# Patient Record
Sex: MALE | Race: WHITE | NOT HISPANIC OR LATINO | Employment: UNEMPLOYED | URBAN - METROPOLITAN AREA
[De-identification: names, ages, dates, MRNs, and addresses within clinical notes are randomized per-mention and may not be internally consistent; named-entity substitution may affect disease eponyms.]

---

## 2020-10-01 ENCOUNTER — TRANSFERRED RECORDS (OUTPATIENT)
Dept: HEALTH INFORMATION MANAGEMENT | Facility: CLINIC | Age: 55
End: 2020-10-01

## 2020-10-04 ENCOUNTER — TRANSFERRED RECORDS (OUTPATIENT)
Dept: HEALTH INFORMATION MANAGEMENT | Facility: CLINIC | Age: 55
End: 2020-10-04

## 2020-10-05 ENCOUNTER — TRANSFERRED RECORDS (OUTPATIENT)
Dept: HEALTH INFORMATION MANAGEMENT | Facility: CLINIC | Age: 55
End: 2020-10-05

## 2020-10-05 LAB — EJECTION FRACTION: 56 %

## 2020-10-19 ENCOUNTER — TRANSFERRED RECORDS (OUTPATIENT)
Dept: HEALTH INFORMATION MANAGEMENT | Facility: CLINIC | Age: 55
End: 2020-10-19

## 2020-11-03 ENCOUNTER — DOCUMENTATION ONLY (OUTPATIENT)
Dept: CARE COORDINATION | Facility: CLINIC | Age: 55
End: 2020-11-03

## 2020-11-10 ENCOUNTER — TRANSFERRED RECORDS (OUTPATIENT)
Dept: HEALTH INFORMATION MANAGEMENT | Facility: CLINIC | Age: 55
End: 2020-11-10

## 2020-11-24 ENCOUNTER — TRANSFERRED RECORDS (OUTPATIENT)
Dept: HEALTH INFORMATION MANAGEMENT | Facility: CLINIC | Age: 55
End: 2020-11-24

## 2020-12-14 ENCOUNTER — OFFICE VISIT (OUTPATIENT)
Dept: CARDIOLOGY | Facility: CLINIC | Age: 55
End: 2020-12-14
Attending: INTERNAL MEDICINE
Payer: COMMERCIAL

## 2020-12-14 VITALS
HEART RATE: 109 BPM | SYSTOLIC BLOOD PRESSURE: 149 MMHG | BODY MASS INDEX: 39.96 KG/M2 | OXYGEN SATURATION: 95 % | DIASTOLIC BLOOD PRESSURE: 106 MMHG | HEIGHT: 72 IN | WEIGHT: 295 LBS

## 2020-12-14 DIAGNOSIS — E78.5 HYPERLIPIDEMIA LDL GOAL <100: ICD-10-CM

## 2020-12-14 DIAGNOSIS — R06.02 SOB (SHORTNESS OF BREATH): ICD-10-CM

## 2020-12-14 DIAGNOSIS — I27.20 PULMONARY HYPERTENSION (H): Primary | ICD-10-CM

## 2020-12-14 PROCEDURE — G0463 HOSPITAL OUTPT CLINIC VISIT: HCPCS

## 2020-12-14 PROCEDURE — 99205 OFFICE O/P NEW HI 60 MIN: CPT | Performed by: INTERNAL MEDICINE

## 2020-12-14 RX ORDER — FUROSEMIDE 40 MG
40 TABLET ORAL DAILY
COMMUNITY
Start: 2020-12-08 | End: 2021-09-10

## 2020-12-14 RX ORDER — TRAZODONE HYDROCHLORIDE 50 MG/1
TABLET, FILM COATED ORAL DAILY
COMMUNITY
Start: 2020-12-09

## 2020-12-14 RX ORDER — BUDESONIDE AND FORMOTEROL FUMARATE DIHYDRATE 160; 4.5 UG/1; UG/1
AEROSOL RESPIRATORY (INHALATION) DAILY
COMMUNITY
Start: 2020-12-08 | End: 2022-08-29

## 2020-12-14 RX ORDER — TIOTROPIUM BROMIDE INHALATION SPRAY 3.12 UG/1
SPRAY, METERED RESPIRATORY (INHALATION) DAILY
COMMUNITY
Start: 2020-12-10 | End: 2022-08-29

## 2020-12-14 RX ORDER — ALBUTEROL SULFATE 90 UG/1
AEROSOL, METERED RESPIRATORY (INHALATION) PRN
COMMUNITY
Start: 2020-11-30

## 2020-12-14 RX ORDER — NICOTINE POLACRILEX 4 MG/1
LOZENGE ORAL
COMMUNITY
Start: 2020-11-18 | End: 2021-03-15

## 2020-12-14 RX ORDER — LIDOCAINE 40 MG/G
CREAM TOPICAL
Status: CANCELLED | OUTPATIENT
Start: 2020-12-14

## 2020-12-14 RX ORDER — BUPROPION HYDROCHLORIDE 150 MG/1
150 TABLET, EXTENDED RELEASE ORAL DAILY
COMMUNITY
Start: 2020-12-08 | End: 2022-08-29

## 2020-12-14 ASSESSMENT — MIFFLIN-ST. JEOR: SCORE: 2206.11

## 2020-12-14 ASSESSMENT — PAIN SCALES - GENERAL: PAINLEVEL: NO PAIN (0)

## 2020-12-14 NOTE — NURSING NOTE
Procedures and/or Testing: Patient given instructions regarding  6 minute walk test,  Pulmonary Function Test, V/Q Scan,. Discussed purpose, preparation, procedure and when to expect results reported back to the patient. Patient demonstrated understanding of this information and agreed to call with further questions or concerns.  Right Heart Catheterization: Patient was instructed regarding right heart catheterization, including discussion of the procedure, preparation, intra-procedural steps, and recovery at home. Patient demonstrated understanding of this information and agreed to call with further questions or concerns.  Labs: Patient was given results of the laboratory testing obtained today. Patient demonstrated understanding of this information and agreed to call with further questions or concerns.   Diet: Patient instructed regarding a heart healthy diet, including discussion of reduced fat and sodium intake. Patient demonstrated understanding of this information and agreed to call with further questions or concerns.  Return Appointment: Patient given instructions regarding scheduling next clinic visit. Patient demonstrated understanding of this information and agreed to call with further questions or concerns.  Patient stated he understood all health information given and agreed to call with further questions or concerns.    Staff message sent to [facility] to schedule F/U testing. Queenie Harley RN on 12/14/2020 at 3:42 PM

## 2020-12-14 NOTE — LETTER
2020      RE: Giuseppe Linton  [address] [facility] [address]       Dear Colleague,    Thank you for the opportunity to participate in the care of your patient, Giuseppe Linton, at the [facility]. Please see a copy of my visit note below.    Chief Complaint   Patient presents with     New Patient     New PH referral from  Ghada Pop @ [facility]      Vitals were taken and medications reconciled.     Gorge Epperson CMA  1:46 PM      Service Date: 2020      Ghada Ppo MD   [facility]   [address], [facility] [address]   [place] [address]      RE: Giuseppe Linton   MRN: [id number]   :  1965      Dear Dr. Pop:      We had the pleasure of seeing Mr. Giuseppe Linton in our Pulmonary Hypertension Clinic at the [facility].  Although you are familiar with his history, please allow me to summarize it for the purpose of records.      Mr. Linton is a 55-year-old gentleman with no significant past medical problems up until October of this year, when he presented to [facility] with worsening exertional shortness of breath and lower extremity swelling.  His echocardiogram showed severely dilated right ventricle with severely reduced right ventricular function.  His estimated right ventricular systolic pressure was elevated.  His CT scan of the chest showed bilateral emphysema.  He also has chronic methamphetamine use for the last 25 years.  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  He was aggressively diuresed.  He was subsequently started on supplemental oxygen 3 liters at rest and 6 liters with exertion.  He had a sleep study that showed evidence of significant obstructive sleep apnea.  He was recommended to follow up with us for further evaluation and management of his pulmonary hypertension and right  heart failure.      Mr. Linton states that he has been having exertional shortness of breath for the last one year, which has been progressively getting worse.  His shortness of breath worsened in the last 6 months preceding his hospitalization at [facility] in October.  He also started noticing lower extremity swelling for the last 6 months.  Furthermore, he started having syncopal episodes, especially after coughing every time in October that really made him seek medical attention.  He has had 6-8 episodes of cough-associated syncope.  He denies having any paroxysmal nocturnal dyspnea or orthopnea.  He does complain of exertional chest pain and chest tightness.  He denies having exertional presyncope or syncope.  His syncope is mainly after coughing.  He has palpitations.      He states that his symptoms are slightly better since most recent hospitalization, being on oxygen and on Lasix; however, he continues to be still significantly limited.  He cannot walk more than a block without stopping. I would currently characterize him as NYHA functional class IIIB.  He has not smoked methamphetamine for the last 85 days.  He has cut down his cigarette smoking significantly.  Of note, he had burned himself in his nose while trying to smoke with oxygen on.      He is accompanied by his sister, who is helping him with his medical care.      PAST MEDICAL HISTORY:   1.  Obesity.   2.  Recent diagnosis of obstructive sleep apnea.   3.  Methamphetamine use.      PAST SURGICAL HISTORY:  Not significant.      MEDICATIONS:  Current Outpatient Medications   Medication Sig     albuterol (PROAIR HFA/PROVENTIL HFA/VENTOLIN HFA) 108 (90 Base) MCG/ACT inhaler as needed     buPROPion (WELLBUTRIN SR) 150 MG 12 hr tablet 150 mg daily     furosemide (LASIX) 40 MG tablet 40 mg daily     SM NICOTINE POLACRILEX 4 MG lozenge      SPIRIVA RESPIMAT 2.5 MCG/ACT inhaler daily     SYMBICORT 160-4.5 MCG/ACT Inhaler daily     traZODone (DESYREL) 50  MG tablet daily     No current facility-administered medications for this visit.      REVIEW OF SYSTEMS:  A detailed 10-point review of systems was obtained as described in the History of Present Illness.  All other systems reviewed and are negative.      PERSONAL AND SOCIAL HISTORY:  He is not .  He has 3 grown kids who are healthy.  He lives alone.  He was a .  He has not been working now with the COVID and health conditions.  He has been smoking 1 pack a day since he was 14 years old.  He has been using meth for the last 20 years.  He does use marijuana on and off.  He denies using cocaine recently, but has used in the remote past.  He does not use any other recreational drugs.  He has not used any diet pills to lose weight.      PHYSICAL EXAMINATION:  He was comfortable.  He was in no apparent distress.  He was awake, alert, oriented x3.  His blood pressure was 149/106, pulse rate was 109, respiratory rate was 16, and he was saturating 95% on 3 liters of oxygen.  He was 6 feet tall.  His BMI was 40.0.  He had no pallor, cyanosis or jaundice.  His neck exam did not reveal any jugular venous distention.  His carotids were 1+ bilaterally.  His pulse was regular in rhythm.  Cardiac auscultation revealed normal S1, loud P2, sinus tachycardia, no murmur, rub or gallop.  Auscultation of his lungs revealed bilateral coarse breath sounds.  He had audible wheezing.  He had no crepitation.  His abdomen was soft with normal bowel sounds, no tenderness, no rigidity, no guarding.  He had no focal neurological deficit.  His extremities showed 1+ edema bilaterally.      I personally reviewed all his testing so far from [facility].  His electrocardiogram showed sinus tachycardia, right ventricular hypertrophy with strain pattern, right axis deviation, all concerning for severe pulmonary hypertension.      I personally reviewed his echocardiogram.  His right ventricle is severely dilated with  severely reduced function.  His intraventricular septum was flattened, consistent with right ventricular pressure and volume overload.  He had moderate tricuspid regurgitation.  His TAPSE was 30 mmHg.  His S prime was 8 cm per second.  His estimated right ventricular systolic pressure was 60 mmHg.  His right atrium was normal in size.  His IVC was normal in size and collapsing more than 50%, consistent with normal right atrial pressure.  His left ventricle was underfilled and small in size.  His left ventricular systolic function was normal.  His left atrium was normal.  He had no evidence of diastolic abnormality.  He had no evidence of any other significant valvular abnormalities.  He had no pericardial effusion.      He has had a CT pulmonary angiogram showed no evidence of proximal chronic thromboembolic disease.  He had evidence of bilateral apical emphysema.  He also had a bilateral pulmonary nodule measuring 1.4 cm in size.  He had pleural calcifications.  He had no evidence of interstitial lung disease.      He had a sleep study locally that showed evidence of moderate to severe obstructive sleep apnea.      ASSESSMENT AND PLAN:     In summary, Mr. Giuseppe Linton is a 55-year-old gentleman with past medical history of chronic methamphetamine use, obesity, obstructive sleep apnea and bilateral apical emphysema, who was referred to us for further management of pulmonary hypertension and right ventricular function.      Mr. 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  He does have bilateral apical emphysema and has a history of tobacco use.  It is possible that his emphysema is also contributing to his pulmonary arterial hypertension.  Furthermore, he also has obstructive sleep apnea which is  probably making things worse.  However, I think the predominant  for his pulmonary vascular disease is his methamphetamine use.  The magnitude of his emphysema  does not correlate with the severity of his pulmonary hypertension.  Likewise, we do not usually see severe pulmonary hypertension and RV dysfunction with obstructive sleep apnea alone.      To this effect, to complete the work-up for his pulmonary hypertension, I have recommended him to have a right heart catheterization.  This will help us confirm the diagnosis, assess severity, assess vasodilator response, as well as assess left-sided filling pressure.  We will also do a V/Q scan to rule out distal chronic thromboembolic disease.  We will do a pulmonary function test to have an objective assessment of the severity of his COPD.  We will also repeat a 6-minute walk test here locally or obtain his last 6-minute walk test from [facility].  Finally, we will complete the pulmonary hypertension serological work-up.      We will decide on therapy based on his workup.  Right now, he appears to be only mildly volume overloaded.  I have recommended him to continue the Lasix 40 mg daily for now.  He is on oxygen 3 liters at rest and 6 liters with exertion which I recommended him to continue.  We reinforced the importance of abstinence from methamphetamine use and cigarette smoking.  We also discussed the importance of a low-salt diet and fluid restriction.  He knows that he cannot smoke with oxygen.      I briefly discussed the various options of pulmonary arterial hypertension associated with methamphetamine use.  This includes oral therapy, inhaled treprostinil or parenteral prostacyclin therapy.  He will complete these testings in the next 1-2 weeks.  He will return to see us for follow-up after completing the testing.  He will call us in the interim if he has any further worsening symptoms.      We will defer the treatment of his emphysema to Dr. Ghada Pop.  He is already on a long-acting bronchodilator, as well as an anticholinergic agent.  He is on supplemental oxygen therapy.      It was a pleasure  meeting Mr. Giuseppe Serra in our Pulmonary Hypertension Clinic at the [facility].  We thank you for involving us in his care.  Please do not hesitate to call us in the interim if you have any further questions.      Sincerely,   Thiago Das MD   [facility]  Heart Failure, Transplant, and Mechanical Circulatory Support Cardiology   Cardiovascular Division  [facility]   [phone]               D: 2020   T: 2020   MT: al      Name:     GIUSEPPE SERRA   MRN:      [id number]        Account:      [id number]   :      1965           Service Date: 2020      Document: [id number]          Please do not hesitate to contact me if you have any questions/concerns.     Sincerely,     Thiago Das MD

## 2020-12-14 NOTE — PATIENT INSTRUCTIONS
Medication Changes:  - No medication changes at this time. Continue current regiment.     Patient Instructions:  1. Continue staying active and eat a heart healthy diet.    2. Please keep current list of medications with you at all times.    3. Remember to weigh yourself daily after voiding and before you consume any food or beverages and log the numbers.  If you have gained 2 pounds overnight or 5 pounds in a week contact us immediately for medication adjustments or further instructions.    4. **Please call us immediately if you have any syncope (fainting or passing out), chest pain, edema (swelling or weight gain), or decline in your functional status (general decline in how you are feeling).    Follow up Appointment Information:  - Right heart catheterization, V/Q scan, 6 minute walk test, and PFTs in the next few weeks to assess your pulmonary hypertension. [facility] will call you to schedule in the next few days  - Follow up with Dr. Das after testing complete to develop a plan moving forward    Check-In  Time Check-In Location Estimated Length Procedure   Name        [facility] room 60-90 minutes Right Heart Catheterization**     Procedure Preparations & Instructions     This is an invasive procedure that DOES require preparation:    - Nothing to eat for 6 hours   - You may have clear liquids up until the time of your procedure  - A ride should be arranged for you in the instance you are unable to drive home, however you should be able to function as you normally would after the procedure     *Mandatory COVID Testing:   Pt will need to complete a COVID test no sooner than 4 days prior to their procedure.      To schedule COVID testing Please call [phone]    If you are running into and issues please call us.     Check-In  Time Check-In Location Estimated Length Procedure   Name        [facility]   waiting room 60 minutes VQ/Lung Perfusion Scan**   Procedure Preparations & Instructions     This is a  non-invasive procedure and does NOT require any preparation         Check-In  Time Check-In Location Estimated Length Procedure   Name        [facility]   3rd Floor 30 minutes Six Minute Walk Test**   Procedure Preparations & Instructions     This is a non-invasive procedure and does NOT require any preparation         Check-In  Time Check-In Location Estimated Length Procedure   Name        [facility]   3rd Floor 60 minutes Pulmonary Function Test**   Procedure Preparations & Instructions     This is a non-invasive procedure and does NOT require any preparation         We are located on the third floor of the Clinic [facility] Center ([facility]) on the [facility].  Our address is     [address] [place] [address] on [address] [place] [address]    Thank you for allowing us to be a part of your care here at the [facility]    If you have questions or concerns please contact us at:    Hannah Thompson, RN, BSN, PHN [facility] (Scheduling,Prior Auth)  Nurse Coordinator     Clinic   Pulmonary Hypertension   Pulmonary Hypertension  [facility]  (Phone)[phone]   (Phone) [phone]        (Fax) [phone]    ** Please note that you will NOT receive a reminder call regarding your scheduled testing, reminder calls are for provider appointments only.  If you are scheduled for testing within the [employer] system you may receive a call regarding pre-registration for billing purposes only.**     Support Group:  Pulmonary Hypertension Association  Https://www.phassociation.org/  **Look at the Events Tab** They even have Support Groups that you can call into    [facility] Support Group  Second Saturday of the Month from 1-3 PM   Location: [address] [place], [place] [id number]  Leader: Adenike Pastrana and Yana Fu  Phone: [phone] or [phone]  Email: [email or web address]

## 2020-12-14 NOTE — PROGRESS NOTES
Service Date: 2020      Ghada Pop MD   [facility]   [address], [place] [address]      RE: Giuseppe Linton   MRN: [id number]   :  1965      Dear Dr. Pop:      We had the pleasure of seeing Mr. Giuseppe Linton in our Pulmonary Hypertension Clinic at the [facility].  Although you are familiar with his history, please allow me to summarize it for the purpose of records.      Mr. Linton is a 55-year-old gentleman with no significant past medical problems up until October of this year, when he presented to [facility] with worsening exertional shortness of breath and lower extremity swelling.  His echocardiogram showed severely dilated right ventricle with severely reduced right ventricular function.  His estimated right ventricular systolic pressure was elevated.  His CT scan of the chest showed bilateral emphysema.  He also has chronic methamphetamine use for the last 25 years.  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  He was aggressively diuresed.  He was subsequently started on supplemental oxygen 3 liters at rest and 6 liters with exertion.  He had a sleep study that showed evidence of significant obstructive sleep apnea.  He was recommended to follow up with us for further evaluation and management of his pulmonary hypertension and right heart failure.      Mr. Linton states that he has been having exertional shortness of breath for the last one year, which has been progressively getting worse.  His shortness of breath worsened in the last 6 months preceding his hospitalization at [facility] in October.  He also started noticing lower extremity swelling for the last 6 months.  Furthermore, he started having syncopal episodes, especially after coughing every time in October that really made him seek medical attention.  He has had 6-8 episodes of  cough-associated syncope.  He denies having any paroxysmal nocturnal dyspnea or orthopnea.  He does complain of exertional chest pain and chest tightness.  He denies having exertional presyncope or syncope.  His syncope is mainly after coughing.  He has palpitations.      He states that his symptoms are slightly better since most recent hospitalization, being on oxygen and on Lasix; however, he continues to be still significantly limited.  He cannot walk more than a block without stopping. I would currently characterize him as NYHA functional class IIIB.  He has not smoked methamphetamine for the last 85 days.  He has cut down his cigarette smoking significantly.  Of note, he had burned himself in his nose while trying to smoke with oxygen on.      He is accompanied by his sister, who is helping him with his medical care.      PAST MEDICAL HISTORY:   1.  Obesity.   2.  Recent diagnosis of obstructive sleep apnea.   3.  Methamphetamine use.      PAST SURGICAL HISTORY:  Not significant.      MEDICATIONS:  Current Outpatient Medications   Medication Sig     albuterol (PROAIR HFA/PROVENTIL HFA/VENTOLIN HFA) 108 (90 Base) MCG/ACT inhaler as needed     buPROPion (WELLBUTRIN SR) 150 MG 12 hr tablet 150 mg daily     furosemide (LASIX) 40 MG tablet 40 mg daily     SM NICOTINE POLACRILEX 4 MG lozenge      SPIRIVA RESPIMAT 2.5 MCG/ACT inhaler daily     SYMBICORT 160-4.5 MCG/ACT Inhaler daily     traZODone (DESYREL) 50 MG tablet daily     No current facility-administered medications for this visit.      REVIEW OF SYSTEMS:  A detailed 10-point review of systems was obtained as described in the History of Present Illness.  All other systems reviewed and are negative.      PERSONAL AND SOCIAL HISTORY:  He is not .  He has 3 grown kids who are healthy.  He lives alone.  He was a .  He has not been working now with the COVID and health conditions.  He has been smoking 1 pack a day since he was 14 years old.   He has been using meth for the last 20 years.  He does use marijuana on and off.  He denies using cocaine recently, but has used in the remote past.  He does not use any other recreational drugs.  He has not used any diet pills to lose weight.      PHYSICAL EXAMINATION:  He was comfortable.  He was in no apparent distress.  He was awake, alert, oriented x3.  His blood pressure was 149/106, pulse rate was 109, respiratory rate was 16, and he was saturating 95% on 3 liters of oxygen.  He was 6 feet tall.  His BMI was 40.0.  He had no pallor, cyanosis or jaundice.  His neck exam did not reveal any jugular venous distention.  His carotids were 1+ bilaterally.  His pulse was regular in rhythm.  Cardiac auscultation revealed normal S1, loud P2, sinus tachycardia, no murmur, rub or gallop.  Auscultation of his lungs revealed bilateral coarse breath sounds.  He had audible wheezing.  He had no crepitation.  His abdomen was soft with normal bowel sounds, no tenderness, no rigidity, no guarding.  He had no focal neurological deficit.  His extremities showed 1+ edema bilaterally.      I personally reviewed all his testing so far from [facility].  His electrocardiogram showed sinus tachycardia, right ventricular hypertrophy with strain pattern, right axis deviation, all concerning for severe pulmonary hypertension.      I personally reviewed his echocardiogram.  His right ventricle is severely dilated with severely reduced function.  His intraventricular septum was flattened, consistent with right ventricular pressure and volume overload.  He had moderate tricuspid regurgitation.  His TAPSE was 30 mmHg.  His S prime was 8 cm per second.  His estimated right ventricular systolic pressure was 60 mmHg.  His right atrium was normal in size.  His IVC was normal in size and collapsing more than 50%, consistent with normal right atrial pressure.  His left ventricle was underfilled and small in size.  His left ventricular  systolic function was normal.  His left atrium was normal.  He had no evidence of diastolic abnormality.  He had no evidence of any other significant valvular abnormalities.  He had no pericardial effusion.      He has had a CT pulmonary angiogram showed no evidence of proximal chronic thromboembolic disease.  He had evidence of bilateral apical emphysema.  He also had a bilateral pulmonary nodule measuring 1.4 cm in size.  He had pleural calcifications.  He had no evidence of interstitial lung disease.      He had a sleep study locally that showed evidence of moderate to severe obstructive sleep apnea.      ASSESSMENT AND PLAN:     In summary, Mr. Giuseppe Linton is a 55-year-old gentleman with past medical history of chronic methamphetamine use, obesity, obstructive sleep apnea and bilateral apical emphysema, who was referred to us for further management of pulmonary hypertension and right ventricular function.      Mr. 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  He does have bilateral apical emphysema and has a history of tobacco use.  It is possible that his emphysema is also contributing to his pulmonary arterial hypertension.  Furthermore, he also has obstructive sleep apnea which is  probably making things worse.  However, I think the predominant  for his pulmonary vascular disease is his methamphetamine use.  The magnitude of his emphysema does not correlate with the severity of his pulmonary hypertension.  Likewise, we do not usually see severe pulmonary hypertension and RV dysfunction with obstructive sleep apnea alone.      To this effect, to complete the work-up for his pulmonary hypertension, I have recommended him to have a right heart catheterization.  This will help us confirm the diagnosis, assess severity, assess vasodilator response, as well as assess left-sided filling pressure.  We will also do a V/Q scan to rule out distal chronic  thromboembolic disease.  We will do a pulmonary function test to have an objective assessment of the severity of his COPD.  We will also repeat a 6-minute walk test here locally or obtain his last 6-minute walk test from [facility].  Finally, we will complete the pulmonary hypertension serological work-up.      We will decide on therapy based on his workup.  Right now, he appears to be only mildly volume overloaded.  I have recommended him to continue the Lasix 40 mg daily for now.  He is on oxygen 3 liters at rest and 6 liters with exertion which I recommended him to continue.  We reinforced the importance of abstinence from methamphetamine use and cigarette smoking.  We also discussed the importance of a low-salt diet and fluid restriction.  He knows that he cannot smoke with oxygen.      I briefly discussed the various options of pulmonary arterial hypertension associated with methamphetamine use.  This includes oral therapy, inhaled treprostinil or parenteral prostacyclin therapy.  He will complete these testings in the next 1-2 weeks.  He will return to see us for follow-up after completing the testing.  He will call us in the interim if he has any further worsening symptoms.      We will defer the treatment of his emphysema to Dr. Ghada Pop.  He is already on a long-acting bronchodilator, as well as an anticholinergic agent.  He is on supplemental oxygen therapy.      It was a pleasure meeting  Giuseppe Linton in our Pulmonary Hypertension Clinic at the [facility].  We thank you for involving us in his care.  Please do not hesitate to call us in the interim if you have any further questions.      Sincerely,   Thiago Das MD   [facility]  Heart Failure, Transplant, and Mechanical Circulatory Support Cardiology   Cardiovascular Division  [facility]   [phone]               D: 12/14/2020   T: 12/14/2020    MT: al      Name:     TONO SERRA   MRN:      [id number]        Account:      [id number]   :      1965           Service Date: 2020      Document: [id number]

## 2020-12-14 NOTE — LETTER
2020      RE: Giuseppe Linton  [address] [place] [address]       Chief Complaint   Patient presents with     New Patient     New PH referral from  Ghada Pop @ [facility]      Vitals were taken and medications reconciled.     Gorge Epperson CMA  1:46 PM      Service Date: 2020      Ghada Pop MD   [facility]   [address], [address] [address]      RE: Giuseppe Linton   MRN: [id number]   :  1965      Dear Dr. Pop:      We had the pleasure of seeing Mr. Giuseppe Linton in our Pulmonary Hypertension Clinic at the [facility].  Although you are familiar with his history, please allow me to summarize it for the purpose of records.      Mr. Linton is a 55-year-old gentleman with no significant past medical problems up until October of this year, when he presented to [facility] with worsening exertional shortness of breath and lower extremity swelling.  His echocardiogram showed severely dilated right ventricle with severely reduced right ventricular function.  His estimated right ventricular systolic pressure was elevated.  His CT scan of the chest showed bilateral emphysema.  He also has chronic methamphetamine use for the last 25 years.  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  He was aggressively diuresed.  He was subsequently started on supplemental oxygen 3 liters at rest and 6 liters with exertion.  He had a sleep study that showed evidence of significant obstructive sleep apnea.  He was recommended to follow up with us for further evaluation and management of his pulmonary hypertension and right heart failure.      Mr. Linton states that he has been having exertional shortness of breath for the last one year, which has been progressively getting worse.  His shortness of breath worsened in the last 6 months preceding his hospitalization at [facility] in October.  He also started noticing lower extremity swelling for the last 6 months.  Furthermore, he started having syncopal episodes, especially after coughing every time in October that really made him seek medical attention.  He has had 6-8 episodes of cough-associated syncope.  He denies having any paroxysmal nocturnal dyspnea or orthopnea.  He does complain of exertional chest pain and chest tightness.  He denies having exertional presyncope or syncope.  His syncope is mainly after coughing.  He has palpitations.      He states that his symptoms are slightly better since most recent hospitalization, being on oxygen and on Lasix; however, he continues to be still significantly limited.  He cannot walk more than a block without stopping. I would currently characterize him as NYHA functional class IIIB.  He has not smoked methamphetamine for the last 85 days.  He has cut down his cigarette smoking significantly.  Of note, he had burned himself in his nose while trying to smoke with oxygen on.      He is accompanied by his sister, who is helping him with his medical care.      PAST MEDICAL HISTORY:   1.  Obesity.   2.  Recent diagnosis of obstructive sleep apnea.   3.  Methamphetamine use.      PAST SURGICAL HISTORY:  Not significant.      MEDICATIONS:  Current Outpatient Medications   Medication Sig     albuterol (PROAIR HFA/PROVENTIL HFA/VENTOLIN HFA) 108 (90 Base) MCG/ACT inhaler as needed     buPROPion (WELLBUTRIN SR) 150 MG 12 hr tablet 150 mg daily     furosemide (LASIX) 40 MG tablet 40 mg daily     SM NICOTINE POLACRILEX 4 MG lozenge      SPIRIVA RESPIMAT 2.5 MCG/ACT inhaler daily     SYMBICORT 160-4.5 MCG/ACT Inhaler daily     traZODone (DESYREL) 50 MG tablet daily     No current facility-administered medications for this visit.      REVIEW OF SYSTEMS:  A detailed 10-point review of systems was obtained as described in the History of Present Illness.  All other systems reviewed and are negative.       PERSONAL AND SOCIAL HISTORY:  He is not .  He has 3 grown kids who are healthy.  He lives alone.  He was a .  He has not been working now with the COVID and health conditions.  He has been smoking 1 pack a day since he was 14 years old.  He has been using meth for the last 20 years.  He does use marijuana on and off.  He denies using cocaine recently, but has used in the remote past.  He does not use any other recreational drugs.  He has not used any diet pills to lose weight.      PHYSICAL EXAMINATION:  He was comfortable.  He was in no apparent distress.  He was awake, alert, oriented x3.  His blood pressure was 149/106, pulse rate was 109, respiratory rate was 16, and he was saturating 95% on 3 liters of oxygen.  He was 6 feet tall.  His BMI was 40.0.  He had no pallor, cyanosis or jaundice.  His neck exam did not reveal any jugular venous distention.  His carotids were 1+ bilaterally.  His pulse was regular in rhythm.  Cardiac auscultation revealed normal S1, loud P2, sinus tachycardia, no murmur, rub or gallop.  Auscultation of his lungs revealed bilateral coarse breath sounds.  He had audible wheezing.  He had no crepitation.  His abdomen was soft with normal bowel sounds, no tenderness, no rigidity, no guarding.  He had no focal neurological deficit.  His extremities showed 1+ edema bilaterally.      I personally reviewed all his testing so far from [facility].  His electrocardiogram showed sinus tachycardia, right ventricular hypertrophy with strain pattern, right axis deviation, all concerning for severe pulmonary hypertension.      I personally reviewed his echocardiogram.  His right ventricle is severely dilated with severely reduced function.  His intraventricular septum was flattened, consistent with right ventricular pressure and volume overload.  He had moderate tricuspid regurgitation.  His TAPSE was 30 mmHg.  His S prime was 8 cm per second.  His estimated right  ventricular systolic pressure was 60 mmHg.  His right atrium was normal in size.  His IVC was normal in size and collapsing more than 50%, consistent with normal right atrial pressure.  His left ventricle was underfilled and small in size.  His left ventricular systolic function was normal.  His left atrium was normal.  He had no evidence of diastolic abnormality.  He had no evidence of any other significant valvular abnormalities.  He had no pericardial effusion.      He has had a CT pulmonary angiogram showed no evidence of proximal chronic thromboembolic disease.  He had evidence of bilateral apical emphysema.  He also had a bilateral pulmonary nodule measuring 1.4 cm in size.  He had pleural calcifications.  He had no evidence of interstitial lung disease.      He had a sleep study locally that showed evidence of moderate to severe obstructive sleep apnea.      ASSESSMENT AND PLAN:     In summary, Mr. Giuseppe Linton is a 55-year-old gentleman with past medical history of chronic methamphetamine use, obesity, obstructive sleep apnea and bilateral apical emphysema, who was referred to us for further management of pulmonary hypertension and right ventricular function.      Mr. 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  He does have bilateral apical emphysema and has a history of tobacco use.  It is possible that his emphysema is also contributing to his pulmonary arterial hypertension.  Furthermore, he also has obstructive sleep apnea which is  probably making things worse.  However, I think the predominant  for his pulmonary vascular disease is his methamphetamine use.  The magnitude of his emphysema does not correlate with the severity of his pulmonary hypertension.  Likewise, we do not usually see severe pulmonary hypertension and RV dysfunction with obstructive sleep apnea alone.      To this effect, to complete the work-up for his pulmonary  hypertension, I have recommended him to have a right heart catheterization.  This will help us confirm the diagnosis, assess severity, assess vasodilator response, as well as assess left-sided filling pressure.  We will also do a V/Q scan to rule out distal chronic thromboembolic disease.  We will do a pulmonary function test to have an objective assessment of the severity of his COPD.  We will also repeat a 6-minute walk test here locally or obtain his last 6-minute walk test from [facility].  Finally, we will complete the pulmonary hypertension serological work-up.      We will decide on therapy based on his workup.  Right now, he appears to be only mildly volume overloaded.  I have recommended him to continue the Lasix 40 mg daily for now.  He is on oxygen 3 liters at rest and 6 liters with exertion which I recommended him to continue.  We reinforced the importance of abstinence from methamphetamine use and cigarette smoking.  We also discussed the importance of a low-salt diet and fluid restriction.  He knows that he cannot smoke with oxygen.      I briefly discussed the various options of pulmonary arterial hypertension associated with methamphetamine use.  This includes oral therapy, inhaled treprostinil or parenteral prostacyclin therapy.  He will complete these testings in the next 1-2 weeks.  He will return to see us for follow-up after completing the testing.  He will call us in the interim if he has any further worsening symptoms.      We will defer the treatment of his emphysema to Dr. Ghada Pop.  He is already on a long-acting bronchodilator, as well as an anticholinergic agent.  He is on supplemental oxygen therapy.      It was a pleasure meeting Mr. Giuseppe Linton in our Pulmonary Hypertension Clinic at the [facility].  We thank you for involving us in his care.  Please do not hesitate to call us in the interim if you have any further questions.      Sincerely,    Thiago Das MD   [facility]ension  Heart Failure, Transplant, and Mechanical Circulatory Support Cardiology   Cardiovascular Division  [facility]   [phone]               D: 2020   T: 2020   MT: al      Name:     TONO SERRA   MRN:      [id number]        Account:      [id number]   :      1965           Service Date: 2020      Document: [id number]          Thiago Das MD

## 2020-12-14 NOTE — PROGRESS NOTES
Chief Complaint   Patient presents with     New Patient     New PH referral from  Ghdaa Pop @ [facility]      Vitals were taken and medications reconciled.     Gorge Epperson CMA  1:46 PM

## 2020-12-15 DIAGNOSIS — Z11.59 ENCOUNTER FOR SCREENING FOR OTHER VIRAL DISEASES: Primary | ICD-10-CM

## 2020-12-21 ENCOUNTER — TELEPHONE (OUTPATIENT)
Dept: CARDIOLOGY | Facility: CLINIC | Age: 55
End: 2020-12-21

## 2020-12-21 DIAGNOSIS — E83.42 HYPOMAGNESEMIA: Primary | ICD-10-CM

## 2020-12-21 NOTE — TELEPHONE ENCOUNTER
Patient verified patient has signed an MERI so we can speak with her, as Genesis e-mailed it to her with his itinerary.    Reviewed all pre-procedure information including using the shuttle on testing day.  She will call me back with patient's magnesium dosage to add to his med list.  Hannah Thompson RN on 12/22/2020 at 10:34 AM    ----------------------  LM for sister advising I was calling to give Giuseppe the pre-procedure information for next week's testing.  We also didn't have her listed on the emergency contact list, so he will need to sign an MERI for us to discuss anything with her.  I do ask that she calls me back. Hannah Thompson RN on 12/22/2020 at 9:41 AM    ------------------------  Sister LM back that patient has VERY bad cell service, so she usually takes his messages and makes sure he gets the information, so I can call her back with the information.  --------------------------  LM on sister's VM that I was calling for her brother Giuseppe.  I asked that she have him call me and leave me a direct dial phone number where I can call him back. Left my direct dial number for call back.  Hannah Thompson RN on 12/21/2020 at 12:39 PM      ----- Message from Hannah Thompson RN sent at 12/15/2020 10:47 AM CST -----  Regarding: Pre-procedure education.  Call patient with pre-procedure education for 12/30/20 procedure.  ----- Message -----  From: Thiago Das MD  Sent: 12/15/2020   7:49 AM CST  To: Hannah Thompson RN, Genesis Chappell  Subject: RE: F/U Testing #ASAP#                           Fine    TT    ----- Message -----  From: Genesis Chappell  Sent: 12/14/2020   4:06 PM CST  To: Hannah Thompson RN, Thiago Das MD  Subject: FW: F/U Testing #ASAP#                           Hello Dr. Das,    My next available RHC is actually with you on 12/30. The cath lab is completely booked out. Please advise if that is okay. Thank you.    Viviana Pulido  ----- Message -----  From: Hannah Thompson,  RN  Sent: 12/14/2020   3:57 PM CST  To: Genesis Chappell  Subject: RE: F/U Testing #ASAP#                           It's fine by me, but I think that is actually a question for Dr. BRITTON.    -Hannah  ----- Message -----  From: Genesis Chappell  Sent: 12/14/2020   3:44 PM CST  To: Hannah Thompson RN  Subject: FW: F/U Testing #ASAP#                           Rd Mcmanusher,    My next available appointment is not till 12/30 (regardless of RHC w/ TT). Please advise if that is okay. Thank you.    -Genesis  ----- Message -----  From: Queenie Harley RN  Sent: 12/14/2020   3:38 PM CST  To: Hannah Thompson RN, Genesis Chappell  Subject: F/U Testing #ASAP#                               Patient was a new PH referral. Here's his plan:    - RHC with vaso, V/Q scan, 6 MWT, and PFTs as soon as possible - ideally with Dr. Das. But if there is a slot available sooner, please schedule him there. Dr. Das is okay adding him on for an in-person F/U at noon after.    Orders placed and pre-procedure instructions reviewed with the patient.     Thanks!Oleg

## 2020-12-22 ENCOUNTER — TELEPHONE (OUTPATIENT)
Dept: CARDIOLOGY | Facility: CLINIC | Age: 55
End: 2020-12-22

## 2020-12-22 RX ORDER — CALCIUM CARBONATE 260MG(650)
2 TABLET,CHEWABLE ORAL DAILY
COMMUNITY
Start: 2020-12-22 | End: 2021-03-15

## 2020-12-27 DIAGNOSIS — Z11.59 ENCOUNTER FOR SCREENING FOR OTHER VIRAL DISEASES: ICD-10-CM

## 2020-12-27 LAB
SARS-COV-2 RNA SPEC QL NAA+PROBE: NORMAL
SPECIMEN SOURCE: NORMAL

## 2020-12-27 PROCEDURE — U0003 INFECTIOUS AGENT DETECTION BY NUCLEIC ACID (DNA OR RNA); SEVERE ACUTE RESPIRATORY SYNDROME CORONAVIRUS 2 (SARS-COV-2) (CORONAVIRUS DISEASE [COVID-19]), AMPLIFIED PROBE TECHNIQUE, MAKING USE OF HIGH THROUGHPUT TECHNOLOGIES AS DESCRIBED BY CMS-2020-01-R: HCPCS | Performed by: INTERNAL MEDICINE

## 2020-12-28 LAB
LABORATORY COMMENT REPORT: NORMAL
SARS-COV-2 RNA SPEC QL NAA+PROBE: NEGATIVE
SPECIMEN SOURCE: NORMAL

## 2020-12-29 ENCOUNTER — TELEPHONE (OUTPATIENT)
Dept: CARDIOLOGY | Facility: CLINIC | Age: 55
End: 2020-12-29

## 2020-12-30 ENCOUNTER — HOSPITAL ENCOUNTER (OUTPATIENT)
Facility: CLINIC | Age: 55
Discharge: HOME | End: 2020-12-30
Attending: INTERNAL MEDICINE | Admitting: INTERNAL MEDICINE
Payer: COMMERCIAL

## 2020-12-30 ENCOUNTER — APPOINTMENT (OUTPATIENT)
Dept: MEDSURG UNIT | Facility: CLINIC | Age: 55
End: 2020-12-30
Attending: INTERNAL MEDICINE
Payer: COMMERCIAL

## 2020-12-30 ENCOUNTER — APPOINTMENT (OUTPATIENT)
Dept: LAB | Facility: CLINIC | Age: 55
End: 2020-12-30
Attending: INTERNAL MEDICINE
Payer: COMMERCIAL

## 2020-12-30 ENCOUNTER — HOSPITAL ENCOUNTER (OUTPATIENT)
Dept: NUCLEAR MEDICINE | Facility: CLINIC | Age: 55
Setting detail: NUCLEAR MEDICINE
Discharge: HOME | End: 2020-12-30
Attending: INTERNAL MEDICINE | Admitting: INTERNAL MEDICINE
Payer: COMMERCIAL

## 2020-12-30 ENCOUNTER — TELEPHONE (OUTPATIENT)
Dept: CARDIOLOGY | Facility: CLINIC | Age: 55
End: 2020-12-30

## 2020-12-30 VITALS
DIASTOLIC BLOOD PRESSURE: 86 MMHG | BODY MASS INDEX: 40.01 KG/M2 | RESPIRATION RATE: 20 BRPM | SYSTOLIC BLOOD PRESSURE: 133 MMHG | HEART RATE: 110 BPM | OXYGEN SATURATION: 93 % | WEIGHT: 295 LBS

## 2020-12-30 DIAGNOSIS — I27.20 PULMONARY HYPERTENSION (H): Primary | ICD-10-CM

## 2020-12-30 DIAGNOSIS — R06.02 SOB (SHORTNESS OF BREATH): ICD-10-CM

## 2020-12-30 DIAGNOSIS — I27.20 PULMONARY HYPERTENSION (H): ICD-10-CM

## 2020-12-30 DIAGNOSIS — E78.5 HYPERLIPIDEMIA LDL GOAL <100: ICD-10-CM

## 2020-12-30 LAB
6 MIN WALK (FT): 1000 FT
6 MIN WALK (M): 305 M
ALBUMIN SERPL-MCNC: 3.5 G/DL (ref 3.4–5)
ALP SERPL-CCNC: 106 U/L (ref 40–150)
ALT SERPL W P-5'-P-CCNC: 30 U/L (ref 0–70)
ANION GAP SERPL CALCULATED.3IONS-SCNC: 4 MMOL/L (ref 3–14)
AST SERPL W P-5'-P-CCNC: 24 U/L (ref 0–45)
BILIRUB DIRECT SERPL-MCNC: <0.1 MG/DL (ref 0–0.2)
BILIRUB SERPL-MCNC: 0.2 MG/DL (ref 0.2–1.3)
BUN SERPL-MCNC: 17 MG/DL (ref 7–30)
CALCIUM SERPL-MCNC: 9.7 MG/DL (ref 8.5–10.1)
CHLORIDE SERPL-SCNC: 100 MMOL/L (ref 94–109)
CHOLEST SERPL-MCNC: 212 MG/DL
CO2 SERPL-SCNC: 33 MMOL/L (ref 20–32)
CREAT SERPL-MCNC: 1.04 MG/DL (ref 0.66–1.25)
CRP SERPL-MCNC: 8.9 MG/L (ref 0–8)
ERYTHROCYTE [DISTWIDTH] IN BLOOD BY AUTOMATED COUNT: 14.7 % (ref 10–15)
GFR SERPL CREATININE-BSD FRML MDRD: 79 ML/MIN/{1.73_M2}
GLUCOSE SERPL-MCNC: 113 MG/DL (ref 70–99)
HCT VFR BLD AUTO: 45.5 % (ref 40–53)
HDLC SERPL-MCNC: 39 MG/DL
HGB BLD-MCNC: 13.9 G/DL (ref 13.3–17.7)
INR PPP: 0.91 (ref 0.86–1.14)
IRON SATN MFR SERPL: 30 % (ref 15–46)
IRON SERPL-MCNC: 107 UG/DL (ref 35–180)
LA PPP-IMP: NEGATIVE
LDLC SERPL CALC-MCNC: 99 MG/DL
MCH RBC QN AUTO: 31.5 PG (ref 26.5–33)
MCHC RBC AUTO-ENTMCNC: 30.5 G/DL (ref 31.5–36.5)
MCV RBC AUTO: 103 FL (ref 78–100)
NONHDLC SERPL-MCNC: 173 MG/DL
NT-PROBNP SERPL-MCNC: 292 PG/ML (ref 0–125)
PLATELET # BLD AUTO: 247 10E9/L (ref 150–450)
POTASSIUM SERPL-SCNC: 4.4 MMOL/L (ref 3.4–5.3)
PROT SERPL-MCNC: 8.1 G/DL (ref 6.8–8.8)
RADIOLOGIST FLAGS: NORMAL
RBC # BLD AUTO: 4.41 10E12/L (ref 4.4–5.9)
RHEUMATOID FACT SER NEPH-ACNC: <7 IU/ML (ref 0–20)
SODIUM SERPL-SCNC: 137 MMOL/L (ref 133–144)
TIBC SERPL-MCNC: 360 UG/DL (ref 240–430)
TRIGL SERPL-MCNC: 370 MG/DL
TSH SERPL DL<=0.005 MIU/L-ACNC: 3 MU/L (ref 0.4–4)
WBC # BLD AUTO: 6 10E9/L (ref 4–11)

## 2020-12-30 PROCEDURE — 999N000132 HC STATISTIC PP CARE STAGE 1

## 2020-12-30 PROCEDURE — 78830 RP LOCLZJ TUM SPECT W/CT 1: CPT

## 2020-12-30 PROCEDURE — 250N000009 HC RX 250: Performed by: INTERNAL MEDICINE

## 2020-12-30 PROCEDURE — 94726 PLETHYSMOGRAPHY LUNG VOLUMES: CPT | Performed by: INTERNAL MEDICINE

## 2020-12-30 PROCEDURE — 36415 COLL VENOUS BLD VENIPUNCTURE: CPT | Performed by: INTERNAL MEDICINE

## 2020-12-30 PROCEDURE — 85027 COMPLETE CBC AUTOMATED: CPT | Performed by: INTERNAL MEDICINE

## 2020-12-30 PROCEDURE — 80061 LIPID PANEL: CPT | Performed by: INTERNAL MEDICINE

## 2020-12-30 PROCEDURE — 86706 HEP B SURFACE ANTIBODY: CPT | Performed by: INTERNAL MEDICINE

## 2020-12-30 PROCEDURE — 93451 RIGHT HEART CATH: CPT | Performed by: INTERNAL MEDICINE

## 2020-12-30 PROCEDURE — 93451 RIGHT HEART CATH: CPT | Mod: 26 | Performed by: INTERNAL MEDICINE

## 2020-12-30 PROCEDURE — C1894 INTRO/SHEATH, NON-LASER: HCPCS | Performed by: INTERNAL MEDICINE

## 2020-12-30 PROCEDURE — 85390 FIBRINOLYSINS SCREEN I&R: CPT | Performed by: PATHOLOGY

## 2020-12-30 PROCEDURE — 83550 IRON BINDING TEST: CPT | Performed by: INTERNAL MEDICINE

## 2020-12-30 PROCEDURE — 86704 HEP B CORE ANTIBODY TOTAL: CPT | Performed by: INTERNAL MEDICINE

## 2020-12-30 PROCEDURE — 93463 DRUG ADMIN & HEMODYNMIC MEAS: CPT | Performed by: INTERNAL MEDICINE

## 2020-12-30 PROCEDURE — 94618 PULMONARY STRESS TESTING: CPT | Performed by: INTERNAL MEDICINE

## 2020-12-30 PROCEDURE — 85613 RUSSELL VIPER VENOM DILUTED: CPT | Performed by: INTERNAL MEDICINE

## 2020-12-30 PROCEDURE — 85730 THROMBOPLASTIN TIME PARTIAL: CPT | Performed by: INTERNAL MEDICINE

## 2020-12-30 PROCEDURE — 999N001035 HC STATISTIC THROMBIN TIME NC: Performed by: INTERNAL MEDICINE

## 2020-12-30 PROCEDURE — 80048 BASIC METABOLIC PNL TOTAL CA: CPT | Performed by: INTERNAL MEDICINE

## 2020-12-30 PROCEDURE — 94729 DIFFUSING CAPACITY: CPT | Performed by: INTERNAL MEDICINE

## 2020-12-30 PROCEDURE — 94375 RESPIRATORY FLOW VOLUME LOOP: CPT | Performed by: INTERNAL MEDICINE

## 2020-12-30 PROCEDURE — 84238 ASSAY NONENDOCRINE RECEPTOR: CPT | Performed by: INTERNAL MEDICINE

## 2020-12-30 PROCEDURE — 86431 RHEUMATOID FACTOR QUANT: CPT | Performed by: INTERNAL MEDICINE

## 2020-12-30 PROCEDURE — 99207 PR NO CHARGE LOS: CPT

## 2020-12-30 PROCEDURE — 86038 ANTINUCLEAR ANTIBODIES: CPT | Performed by: INTERNAL MEDICINE

## 2020-12-30 PROCEDURE — 84443 ASSAY THYROID STIM HORMONE: CPT | Performed by: INTERNAL MEDICINE

## 2020-12-30 PROCEDURE — 83520 IMMUNOASSAY QUANT NOS NONAB: CPT | Performed by: INTERNAL MEDICINE

## 2020-12-30 PROCEDURE — 83880 ASSAY OF NATRIURETIC PEPTIDE: CPT | Performed by: INTERNAL MEDICINE

## 2020-12-30 PROCEDURE — 78580 LUNG PERFUSION IMAGING: CPT | Mod: 26

## 2020-12-30 PROCEDURE — 343N000001 HC RX 343: Performed by: INTERNAL MEDICINE

## 2020-12-30 PROCEDURE — 87340 HEPATITIS B SURFACE AG IA: CPT | Performed by: INTERNAL MEDICINE

## 2020-12-30 PROCEDURE — A9540 TC99M MAA: HCPCS | Performed by: INTERNAL MEDICINE

## 2020-12-30 PROCEDURE — 93463 DRUG ADMIN & HEMODYNMIC MEAS: CPT | Mod: GC | Performed by: INTERNAL MEDICINE

## 2020-12-30 PROCEDURE — 80076 HEPATIC FUNCTION PANEL: CPT | Performed by: INTERNAL MEDICINE

## 2020-12-30 PROCEDURE — 272N000002 HC OR SUPPLY OTHER OPNP: Performed by: INTERNAL MEDICINE

## 2020-12-30 PROCEDURE — 86803 HEPATITIS C AB TEST: CPT | Performed by: INTERNAL MEDICINE

## 2020-12-30 PROCEDURE — 85610 PROTHROMBIN TIME: CPT | Performed by: INTERNAL MEDICINE

## 2020-12-30 PROCEDURE — 87389 HIV-1 AG W/HIV-1&-2 AB AG IA: CPT | Performed by: INTERNAL MEDICINE

## 2020-12-30 PROCEDURE — 86140 C-REACTIVE PROTEIN: CPT | Performed by: INTERNAL MEDICINE

## 2020-12-30 PROCEDURE — 272N000001 HC OR GENERAL SUPPLY STERILE: Performed by: INTERNAL MEDICINE

## 2020-12-30 PROCEDURE — 83540 ASSAY OF IRON: CPT | Performed by: INTERNAL MEDICINE

## 2020-12-30 RX ORDER — LIDOCAINE 40 MG/G
CREAM TOPICAL
Status: COMPLETED | OUTPATIENT
Start: 2020-12-30 | End: 2020-12-30

## 2020-12-30 RX ADMIN — LIDOCAINE: 40 CREAM TOPICAL at 08:33

## 2020-12-30 RX ADMIN — KIT FOR THE PREPARATION OF TECHNETIUM TC 99M ALBUMIN AGGREGATED 6.1 MILLICURIE: 2.5 INJECTION, POWDER, FOR SOLUTION INTRAVENOUS at 12:07

## 2020-12-30 NOTE — PROGRESS NOTES
Pt on 2A post right heart cath; pt awake and alert, denies pain. R neck site is flat, dry and intact. Pt is NPO for next exam; will send snack for later. Pt clarified he has his discharge instructions, he denies questions. Awaiting visit from Dr Das.

## 2020-12-30 NOTE — DISCHARGE INSTRUCTIONS
[facility]                        Interventional Cardiology  Discharge Instructions   Post Right Heart Cath      AFTER YOU GO HOME:    DO drink plenty of fluids    DO resume your regular diet and medications unless otherwise instructed by your Primary Physician    Do Not scrub the procedure site vigorously    No lotion or powder to the puncture site for 3 days    CALL YOUR PRIMARY PHYSICIAN IF: You may resume all normal activity.  Monitor neck site for bleeding, swelling, or voice changes. If you notice bleeding or swelling immediately apply pressure to the site and call number below to speak with Cardiology Fellow.  If you experience any changes in your breathing you should call your doctor immediately or come to the closest Emergency Department.  Do not drive yourself.    ADDITIONAL INSTRUCTIONS: Medications: You are to resume all home medications including anticoagulation therapy unless otherwise advised by your primary cardiologist or nurse coordinator.    Follow Up: Per your primary cardiology team    If you have any questions or concerns regarding your procedure site please call [phone] at anytime and ask for Cardiology Fellow on call.  They are available 24 hours a day.  You may also contact the Cardiology Clinic after hours number at [phone].                                                       Telephone Numbers [phone] Monday-Friday 8:00 am to 4:30 pm    [phone] After 4:30 pm Monday-Friday, Weekends & Holidays  Ask for Interventional Cardiologist on call. Someone is on call 24 hours/day   [facility] toll free number [phone] Monday-Friday 8:00 am to 4:30 pm   [facility] Emergency Dept [phone]

## 2020-12-30 NOTE — TELEPHONE ENCOUNTER
[facility] Call Center    Phone Message    May a detailed message be left on voicemail: yes     Reason for Call: Other: Choua from imaging reporting an incidental finding on the NM lung test done 12/30. Please call back to acknowledge that.       Action Taken: Message routed to:  [facility] cardio    Travel Screening: Not Applicable  ----------------------------  Reviewed report and routed to Dr. Das. Hannah Thompson RN on 12/31/2020 at 5:09 PM

## 2020-12-30 NOTE — PROGRESS NOTES
Pt to unit 2a for right heart cath. All questions answered, consent completed, lidocaine cream in place.

## 2020-12-30 NOTE — IP AVS SNAPSHOT
[facility] [address] [address]                                    After Visit Summary   12/30/2020    Giuseppe Linton    MRN: [id number]           After Visit Summary Signature Page    I have received my discharge instructions, and my questions have been answered. I have discussed any challenges I see with this plan with the nurse or doctor.    ..........................................................................................................................................  Patient/Patient Representative Signature      ..........................................................................................................................................  Patient Representative Print Name and Relationship to Patient    ..................................................               ................................................  Date                                   Time    ..........................................................................................................................................  Reviewed by Signature/Title    ...................................................              ..............................................  Date                                               Time          22EPIC Rev 08/18

## 2020-12-30 NOTE — IP AVS SNAPSHOT
MRN:[id number]                      After Visit Summary   12/30/2020    Giuseppe Linton    MRN: [id number]           Visit Information        Department      12/30/2020  7:00 AM [facility] [address] [place]          Review of your medicines      UNREVIEWED medicines. Ask your doctor about these medicines       Dose / Directions   albuterol 108 (90 Base) MCG/ACT inhaler  Commonly known as: PROAIR HFA/PROVENTIL HFA/VENTOLIN HFA      as needed  Refills: 0     buPROPion 150 MG 12 hr tablet  Commonly known as: WELLBUTRIN SR      Dose: 150 mg  150 mg daily  Refills: 0     furosemide 40 MG tablet  Commonly known as: LASIX      Dose: 40 mg  40 mg daily  Refills: 0     Magnesium Citrate 100 MG Tabs  Used for: Hypomagnesemia      Dose: 2 tablet  Take 2 tablets by mouth daily  Refills: 0     MULTI vitamin  MENS Tabs  Used for: Hypomagnesemia      Dose: 1 tablet  Take 1 tablet by mouth daily  Refills: 0     SM Nicotine Polacrilex 4 MG lozenge  Generic drug: nicotine      Refills: 0     Spiriva Respimat 2.5 MCG/ACT inhaler  Generic drug: tiotropium      daily  Refills: 0     Symbicort 160-4.5 MCG/ACT Inhaler  Generic drug: budesonide-formoterol      daily  Refills: 0     traZODone 50 MG tablet  Commonly known as: DESYREL      daily  Refills: 0              Protect others around you: Learn how to safely use, store and throw away your medicines at www.disposemymeds.org.       Follow-ups after your visit       Your next 10 appointments already scheduled    Dec 30, 2020  Procedure with Thiago Das MD  [facility] ([facility], [facility]) [address] [address]  [phone]   The [facility] is located on the corner of [address] and [address] on the [facility]. It is easily accessible from virtually any point in the [facility]  area, via [address] and [address].   Dec 30, 2020 12:00 PM  NM LUNG SCAN VENTILATION AND PERFUSION with UUNM2  [facility] ([facility]) [address] [address] [phone]   How do I prepare for my exam? (Food and drink instructions)  No Food and Drink Restrictions.    How do I prepare for my exam? (Other instructions)  If you have not had a Chest X-Ray within 24 hrs (or X-Ray is not available to the Radiologists to visually view), a Chest X-Ray will be done prior to the Lung Scan.    What should I wear: You should wear comfortable clothes. Leave your valuables at home.    How long does the exam take:  A Lung Scan will take anywhere from 30-90 minutes    What should I bring: Please bring a list of your medicines to the exam. (Include vitamins, minerals and over-the-counter drugs.) Please bring related prior results and films.    Do I need a :  No  is needed.    What do I need to tell my doctor?  Tell your doctor if you are breastfeeding or if there is any chance you may be pregnant.  If you have had a barium test within the past few days. Barium may change the results of certain exams.  If you think you may need sedation (medicine to help you relax).    What should I do after the exam: No restrictions, you may resume normal activities. The radioactive fluid will leave your body when you urinate (use the toilet). If you drink extra fluids, it will leave your body faster.    What is this test: This scan checks how lungs are working. There are potentially two parts to this exam.  One of the portions will show us blood supply to your lungs.  This is done by placing an IV (tiny needle) in your hand or arm. We inject the radioactive fluid through this needle.  We take a set of pictures.  The other potion of the exam will show us the air distribution to your lungs.  This is done by having you breathe a radioactive mist for  4-7 minutes.  Another set of pictures are taken.    Who should I call with questions: Please call your Imaging Department at your exam site with any questions. Directions, parking instructions, and other information are available on our website, [email or web address].     Dec 30, 2020  2:00 PM  (Arrive by 1:45 PM)  Six Minute Walk with UC PFL 6 MINUTE WALK 1  [facility]) [address] [place] [address]  [phone]      Dec 30, 2020  2:30 PM  (Arrive by 2:15 PM)  FULL PULMONARY FUNCTION with UC PFL B  [facility]) [address] [place] [address]  [phone]      Jan 11, 2021 10:00 AM  (Arrive by 9:45 AM)  RETURN PRIMARY PULMONARY with Thiago Das MD  [facility]) [address] [place] [id number] [phone]         Care Instructions       Further instructions from your care team       [facility]                        Interventional Cardiology  Discharge Instructions   Post Right Heart Cath      AFTER YOU GO HOME:    DO drink plenty of fluids    DO resume your regular diet and medications unless otherwise instructed by your Primary Physician    Do Not scrub the procedure site vigorously    No lotion or powder to the puncture site for 3 days    CALL YOUR PRIMARY PHYSICIAN IF: You may resume all normal activity.  Monitor neck site for bleeding, swelling, or voice changes. If you notice bleeding or swelling immediately apply pressure to the site and call number below to speak with Cardiology Fellow.  If you experience any changes in your breathing you should call your doctor immediately or come to the closest Emergency Department.  Do not drive yourself.    ADDITIONAL INSTRUCTIONS: Medications:  "You are to resume all home medications including anticoagulation therapy unless otherwise advised by your primary cardiologist or nurse coordinator.    Follow Up: Per your primary cardiology team    If you have any questions or concerns regarding your procedure site please call [phone] at anytime and ask for Cardiology Fellow on call.  They are available 24 hours a day.  You may also contact the Cardiology Clinic after hours number at [phone].                                                       Telephone Numbers [phone] Monday-Friday 8:00 am to 4:30 pm    [phone] After 4:30 pm Monday-Friday, Weekends & Holidays  Ask for Interventional Cardiologist on call. Someone is on call 24 hours/day   [facility] toll free number [phone] Monday-Friday 8:00 am to 4:30 pm   [facility] Emergency Dept [phone]                   Additional Information About Your Visit       [employer] Information    [employer] lets you send messages to your doctor, view your test results, renew your prescriptions, schedule appointments and more. To sign up, go to [email or web address] . Click on \"Log in\" on the left side of the screen, which will take you to the Welcome page. Then click on \"Sign up Now\" on the right side of the page.     You will be asked to enter the access code listed below, as well as some personal information. Please follow the directions to create your username and password.     Your access code is: [email or web address]  Expires: 2021  6:30 AM     Your access code will  in 60 days. If you need help or a new code, please call your   [facility] clinic or [phone].       Care EveryWhere ID    This is your Care EveryWhere ID. This could be used by other organizations to access your [facility] medical records  [id number]        Primary Care Provider Fax #    Physician No Ref-Primary [phone]      Equal Access to Services    RITU GARZA AH: kyree De Luna " eliecer anibaljamar srivastavajuan pablo britton ah. So [facility] [phone].    ATENCIÓN: Si osei nicholson, tiene a child disposición servicios gratuitos de asistencia lingüística. Chintaname al [phone].    We comply with applicable federal and state civil rights laws, including the [place] Human Rights Act. We do not discriminate on the basis of race, color, creed, Presybeterian, national origin, marital status, age, disability, sex, sexual orientation, or gender identity.    If you would like an itemization of your charges they will now be available in [employer] 30 days after discharge. To access the itemized statements in [employer] go to billing/billing summary. From there select view account. There will be multiple tabs showing an overview of your account, detail, payments, and communications. From the communications tab you can see your monthly statements, your itemized statements, and any billing letters generated for your account. If you do not have a [employer] account and need help getting access please contact [employer] support at [phone].  If you would prefer to have your itemized statements mailed please contact our automated itemized bill request line at [phone] option  2.       Thank you!    Thank you for choosing [facility] for your care. Our goal is always to provide you with excellent care. Hearing back from our patients is one way we can continue to improve our services. Please take a few minutes to complete the written survey that you may receive in the mail after you visit with us. Thank you!            Medication List      ASK your doctor about these medications          Morning Afternoon Evening Bedtime As Needed    albuterol 108 (90 Base) MCG/ACT inhaler  Also known as: PROAIR HFA/PROVENTIL HFA/VENTOLIN HFA  INSTRUCTIONS: as needed  Doctor's comments: Pharmacy may dispense brand covered by insurance (Proair, or proventil or ventolin or generic albuterol inhaler)                      buPROPion 150 MG 12 hr tablet  Also known as: WELLBUTRIN SR  INSTRUCTIONS: 150 mg daily                     furosemide 40 MG tablet  Also known as: LASIX  INSTRUCTIONS: 40 mg daily                     Magnesium Citrate 100 MG Tabs  INSTRUCTIONS: Take 2 tablets by mouth daily                     MULTI vitamin  MENS Tabs  INSTRUCTIONS: Take 1 tablet by mouth daily                     SM Nicotine Polacrilex 4 MG lozenge  Generic drug: nicotine                     Spiriva Respimat 2.5 MCG/ACT inhaler  INSTRUCTIONS: daily  Generic drug: tiotropium                     Symbicort 160-4.5 MCG/ACT Inhaler  INSTRUCTIONS: daily  Generic drug: budesonide-formoterol                     traZODone 50 MG tablet  Also known as: DESYREL  INSTRUCTIONS: daily

## 2020-12-31 LAB
ANA SER QL IF: NEGATIVE
HBV CORE AB SERPL QL IA: NONREACTIVE
HBV SURFACE AB SERPL IA-ACNC: 1.14 M[IU]/ML
HBV SURFACE AG SERPL QL IA: NONREACTIVE
HCV AB SERPL QL IA: NONREACTIVE
HIV 1+2 AB+HIV1 P24 AG SERPL QL IA: NONREACTIVE
IL6 SERPL-MCNC: 3.61 PG/ML
STFR SERPL-SCNC: 2 MG/L (ref 2.2–5)

## 2020-12-31 NOTE — PROGRESS NOTES
Patient completed testing for his pulmonary hypertension today.    His VQ scan showed small wedge-shaped mismatch perfusion defects in both lower lobes consistent with possible chronic thromboembolic pulmonary pretension.  He also has a matched perfusion defect in the right upper lobe in the area of the emphysematous    His pulmonary function test showed reduced FEV1/ FVC.ratio concerning for emphysema as well as significantly reduced lung volume likely due to restrictive physiology from his obesity. On her 6-minute walk test, he walked for 305 m.  He desaturated to the lower 80s despite being on 4 L of oxygen. CT scan of his chest showed emphysematous bullae on the right upper lobe.    His right heart catheterization showed an RA pressure of 10, RV of 52/50, PA of 49/29 with a mean of 39, pulmonary capillary wedge pressure of 60, measured Leonel cardiac output of 8.2 with an index of 3.2, thermodilution cardiac output of 7.9 with an index of 3.1, measured VO2 of 392 mL/min, SVR of thousand dynes/cm-5/sec, and a PVR of 2.7 Wood units.  He had no acute vasodilator response with inhaled nitric oxide.    His NT proBNP was mildly elevated at 292.  His LDL and cholesterol and triglycerides were significantly elevated.  He had mild mild elevation of his bicarbonate likely suggestive of underlying CO2 retention from his obesity.    Of note, he also completed a sleep study and was noted to have obstructive sleep apnea.  He is in the process of getting a CPAP machine.    Assessment and Plan    In summary, Mr. Hernandez is a very pleasant 55-year-old gentleman was referred to us for further evaluation and management of pulmonary hypertension.    Based on the testing he has had so far he has moderate pulmonary hypertension with mildly elevated left-sided filling pressure, mildly elevated PVR, and high normal cardiac output.  This is likely multifactorial in etiology.  He has emphysema of the right upper lobe, obstructive sleep  apnea, obesity hypoventilation, diastolic dysfunction, and probable thromboembolic disease with small subsegmental mismatched defects.    On a positive note, his cardiac output is preserved, PVR is only mildly elevated. Thus, especially given his multiple comorbidities, supportive therapy is probably the best first option.  There is no strong indication for pulmonary vasodilator therapy .  I will discuss with him the importance of Zoroastrianism use of supplemental oxygen 24/7.  I will also discuss with him the importance of using his CPAP machine.  We will recommend him to have a low salt diet and fluid restriction to 2 L.  We will continue him on Lasix 40 mg to keep him euvolemic.  We will also discuss with him the importance of losing weight.      We will repeat an echocardiogram as it has been a while to assess improvement in his right ventricular size and systolic function.     We will discuss risks and benefits of anticoagulation therapy given subsegmental perfusion defects. It is difficult to determine whether he really has CTEPH or not. His CT pulmonary angiogram showed no proximal or segmental perfusion defects.     We also discussed with him the importance of not smoking methamphetamine.  He has not smoked meth in the last 2 months since his last hospitalization.  He has also significantly cut down on his smoking.    We will see him back in clinic in 3 months.  He will call us in the interim of any further worsening symptoms.  It was a pleasure meeting Mr. Giuseppe Hernandez in our pulmonary hypertension clinic at [facility].  Thank you for involving us in his care.    Sincerely,  Thiago Das MD   [facility]  Heart Failure, Transplant, and Mechanical Circulatory Support Cardiology   Cardiovascular Division  [facility]   [phone]

## 2021-01-01 NOTE — Clinical Note
Potential access sites were evaluated for patency using ultrasound.   The right jugular vein was selected. Access was obtained under with Sonosite and Fluoroscopic guidance using a micropuncture 21 guage needle with direct visualization of needle entry.      49.5

## 2021-01-04 LAB
DLCOCOR-%PRED-PRE: 59 %
DLCOCOR-PRE: 17.85 ML/MIN/MMHG
DLCOUNC-%PRED-PRE: 58 %
DLCOUNC-PRE: 17.49 ML/MIN/MMHG
DLCOUNC-PRED: 29.96 ML/MIN/MMHG
ERV-%PRED-PRE: 37 %
ERV-PRE: 0.3 L
ERV-PRED: 0.8 L
EXPTIME-PRE: 9.05 SEC
FEF2575-%PRED-PRE: 24 %
FEF2575-PRE: 0.83 L/SEC
FEF2575-PRED: 3.34 L/SEC
FEFMAX-%PRED-PRE: 48 %
FEFMAX-PRE: 4.85 L/SEC
FEFMAX-PRED: 9.91 L/SEC
FEV1-%PRED-PRE: 41 %
FEV1-PRE: 1.62 L
FEV1FEV6-PRE: 71 %
FEV1FEV6-PRED: 79 %
FEV1FVC-PRE: 69 %
FEV1FVC-PRED: 78 %
FEV1SVC-PRE: 59 %
FEV1SVC-PRED: 73 %
FIFMAX-PRE: 4.57 L/SEC
FRCPLETH-%PRED-PRE: 90 %
FRCPLETH-PRE: 3.35 L
FRCPLETH-PRED: 3.7 L
FVC-%PRED-PRE: 45 %
FVC-PRE: 2.33 L
FVC-PRED: 5.1 L
IC-%PRED-PRE: 53 %
IC-PRE: 2.46 L
IC-PRED: 4.62 L
RVPLETH-%PRED-PRE: 126 %
RVPLETH-PRE: 3.05 L
RVPLETH-PRED: 2.41 L
TLCPLETH-%PRED-PRE: 77 %
TLCPLETH-PRE: 5.82 L
TLCPLETH-PRED: 7.53 L
VA-%PRED-PRE: 59 %
VA-PRE: 4.17 L
VC-%PRED-PRE: 51 %
VC-PRE: 2.77 L
VC-PRED: 5.42 L

## 2021-01-04 NOTE — TELEPHONE ENCOUNTER
Health Call Center    Phone Message    May a detailed message be left on voicemail: no     Reason for Call: Other: Incidental finding on NM Test on 12/30/2020     Action Taken: Message routed to:  [facility] Cardiology    Travel Screening: Not Applicable           -------------------------------  Reviewed results of the NM lung scan (VQ) scan. I acknowledge the presence of Lung nodules that require MD notification.  Hannah Thompson RN on 1/4/2021 at 3:00 PM  -------------------------------  Sent msg to  alerting him to findings. Hannah Thompson RN on 1/4/2021 at 3:12 PM

## 2021-01-05 ENCOUNTER — TELEPHONE (OUTPATIENT)
Dept: CARDIOLOGY | Facility: CLINIC | Age: 56
End: 2021-01-05

## 2021-01-05 DIAGNOSIS — R06.02 SOB (SHORTNESS OF BREATH): ICD-10-CM

## 2021-01-05 DIAGNOSIS — I27.20 PULMONARY HYPERTENSION (H): Primary | ICD-10-CM

## 2021-01-05 RX ORDER — LIDOCAINE 40 MG/G
CREAM TOPICAL
Status: CANCELLED | OUTPATIENT
Start: 2021-01-05

## 2021-01-05 NOTE — TELEPHONE ENCOUNTER
----- Message -----  From: Thiago Das MD  Sent: 1/4/2021   1:18 PM CST  To: Cardiology Ph Nurse-    Team:    He completed his testing last week. He has moderate PH due to KELLY/Diastolic dysfunction/Methamphetamine.     Plan:    1. NO PH medicine  2. Supportive therapy with oxygen, CPAP, Salt and Fluid restriction, and abstinence from methamphetamine  3. He has an appointment with me on Jan 11 - Check with the patient. I am ok to cancel this if he is fine. Happy to see him as well if he would prefer to come.   4. RTC in 3 months with 6MWT, labs, and RHC    I spoke to his sister, who is his contact, and updated the plan with her. He does not have cell phone coverage where he lives up [place].     Please let me know if you have nay questions,    Thank you    TT    ------------------------------  Verified all orders placed.  RHC orders placed in orders only encounter. Hannah Thompson RN on 1/5/2021 at 9:36 AM

## 2021-01-15 NOTE — PROGRESS NOTES
Genesis Chappell P sent to Hannah Thompson, MANN Young,     Could you place an echo order? Per Tt's notes he wants a repeat echo too.     And you can remove the RHC order.     Thank you,   -Genesis

## 2021-01-19 ENCOUNTER — TRANSFERRED RECORDS (OUTPATIENT)
Dept: HEALTH INFORMATION MANAGEMENT | Facility: CLINIC | Age: 56
End: 2021-01-19

## 2021-03-14 ASSESSMENT — ENCOUNTER SYMPTOMS
DECREASED CONCENTRATION: 0
COUGH DISTURBING SLEEP: 1
FEVER: 0
HALLUCINATIONS: 0
WHEEZING: 0
DYSPNEA ON EXERTION: 1
CHILLS: 0
SHORTNESS OF BREATH: 1
INCREASED ENERGY: 0
HEMOPTYSIS: 0
DEPRESSION: 1
WEIGHT GAIN: 0
COUGH: 1
FATIGUE: 1
DECREASED APPETITE: 0
NIGHT SWEATS: 0
PANIC: 0
NERVOUS/ANXIOUS: 0
POLYPHAGIA: 0
POSTURAL DYSPNEA: 1
ALTERED TEMPERATURE REGULATION: 0
SPUTUM PRODUCTION: 1
INSOMNIA: 1
POLYDIPSIA: 0
WEIGHT LOSS: 0
SNORES LOUDLY: 1

## 2021-03-15 ENCOUNTER — OFFICE VISIT (OUTPATIENT)
Dept: CARDIOLOGY | Facility: CLINIC | Age: 56
End: 2021-03-15
Attending: INTERNAL MEDICINE
Payer: COMMERCIAL

## 2021-03-15 VITALS
DIASTOLIC BLOOD PRESSURE: 77 MMHG | HEART RATE: 114 BPM | OXYGEN SATURATION: 90 % | SYSTOLIC BLOOD PRESSURE: 113 MMHG | WEIGHT: 291 LBS | BODY MASS INDEX: 39.42 KG/M2 | HEIGHT: 72 IN

## 2021-03-15 VITALS — SYSTOLIC BLOOD PRESSURE: 112 MMHG | DIASTOLIC BLOOD PRESSURE: 80 MMHG

## 2021-03-15 DIAGNOSIS — I27.20 PULMONARY HYPERTENSION (H): ICD-10-CM

## 2021-03-15 DIAGNOSIS — R06.02 SOB (SHORTNESS OF BREATH): ICD-10-CM

## 2021-03-15 DIAGNOSIS — I51.89 OTHER ILL-DEFINED HEART DISEASES: Primary | ICD-10-CM

## 2021-03-15 LAB
6 MIN WALK (FT): 930 FT
6 MIN WALK (M): 283 M
ANION GAP SERPL CALCULATED.3IONS-SCNC: 5 MMOL/L (ref 3–14)
BUN SERPL-MCNC: 15 MG/DL (ref 7–30)
CALCIUM SERPL-MCNC: 9.1 MG/DL (ref 8.5–10.1)
CHLORIDE SERPL-SCNC: 104 MMOL/L (ref 94–109)
CO2 SERPL-SCNC: 30 MMOL/L (ref 20–32)
CREAT SERPL-MCNC: 1.11 MG/DL (ref 0.66–1.25)
ERYTHROCYTE [DISTWIDTH] IN BLOOD BY AUTOMATED COUNT: 14.6 % (ref 10–15)
GFR SERPL CREATININE-BSD FRML MDRD: 73 ML/MIN/{1.73_M2}
GLUCOSE SERPL-MCNC: 103 MG/DL (ref 70–99)
HCT VFR BLD AUTO: 41.7 % (ref 40–53)
HGB BLD-MCNC: 13 G/DL (ref 13.3–17.7)
MCH RBC QN AUTO: 31.9 PG (ref 26.5–33)
MCHC RBC AUTO-ENTMCNC: 31.2 G/DL (ref 31.5–36.5)
MCV RBC AUTO: 103 FL (ref 78–100)
NT-PROBNP SERPL-MCNC: 81 PG/ML (ref 0–125)
PLATELET # BLD AUTO: 200 10E9/L (ref 150–450)
POTASSIUM SERPL-SCNC: 4.4 MMOL/L (ref 3.4–5.3)
RBC # BLD AUTO: 4.07 10E12/L (ref 4.4–5.9)
SODIUM SERPL-SCNC: 138 MMOL/L (ref 133–144)
WBC # BLD AUTO: 7.4 10E9/L (ref 4–11)

## 2021-03-15 PROCEDURE — 99207 PR STATISTIC IV PUSH SINGLE INITIAL SUBSTANCE: CPT | Performed by: INTERNAL MEDICINE

## 2021-03-15 PROCEDURE — 80048 BASIC METABOLIC PNL TOTAL CA: CPT | Performed by: PATHOLOGY

## 2021-03-15 PROCEDURE — 83880 ASSAY OF NATRIURETIC PEPTIDE: CPT | Performed by: PATHOLOGY

## 2021-03-15 PROCEDURE — 36415 COLL VENOUS BLD VENIPUNCTURE: CPT | Performed by: PATHOLOGY

## 2021-03-15 PROCEDURE — 94618 PULMONARY STRESS TESTING: CPT

## 2021-03-15 PROCEDURE — 85027 COMPLETE CBC AUTOMATED: CPT | Performed by: PATHOLOGY

## 2021-03-15 PROCEDURE — 93306 TTE W/DOPPLER COMPLETE: CPT | Performed by: INTERNAL MEDICINE

## 2021-03-15 PROCEDURE — 99214 OFFICE O/P EST MOD 30 MIN: CPT | Mod: 25 | Performed by: INTERNAL MEDICINE

## 2021-03-15 PROCEDURE — G0463 HOSPITAL OUTPT CLINIC VISIT: HCPCS

## 2021-03-15 RX ORDER — LIDOCAINE 40 MG/G
CREAM TOPICAL
Status: CANCELLED | OUTPATIENT
Start: 2021-03-15

## 2021-03-15 RX ORDER — ACETAMINOPHEN 325 MG/1
325-650 TABLET ORAL EVERY 6 HOURS PRN
COMMUNITY

## 2021-03-15 RX ORDER — FLUTICASONE PROPIONATE 50 MCG
SPRAY, SUSPENSION (ML) NASAL
COMMUNITY
Start: 2021-01-19 | End: 2021-03-15

## 2021-03-15 RX ADMIN — Medication 6 ML: at 13:59

## 2021-03-15 ASSESSMENT — MIFFLIN-ST. JEOR: SCORE: 2187.97

## 2021-03-15 ASSESSMENT — PAIN SCALES - GENERAL: PAINLEVEL: NO PAIN (0)

## 2021-03-15 NOTE — PATIENT INSTRUCTIONS
Medication Changes:  - No medication changes at this time. Please continue current medication regiment.    Patient Instructions:  1. Continue staying active and eat a heart healthy diet.    2. Please keep current list of medications with you at all times.    3. Remember to weigh yourself daily after voiding and before you consume any food or beverages and log the numbers.  If you have gained 2 pounds overnight or 5 pounds in a week contact us immediately for medication adjustments or further instructions.    4. **Please call us immediately if you have any syncope (fainting or passing out), chest pain, edema (swelling or weight gain), or decline in your functional status (general decline in how you are feeling).    Follow up Appointment Information:  - Increase supplemental oxygen to 8L or more, as needed, to keep oxygen saturations above 90% with exertion  - Please call us immediately if your shortness of breath worsens   - Follow up with Dr. Das in 3 months with a right heart catheterization. [facility] will call you to schedule in May.    Check-In  Time Check-In Location Estimated Length Procedure   Name     June 2021   [facility]  waiting room 60-90 minutes Right Heart Catheterization**     Procedure Preparations & Instructions     This is an invasive procedure that DOES require preparation:    - Nothing to eat for 6 hours   - You may have clear liquids up until the time of your procedure  - A ride should be arranged for you in the instance you are unable to drive home, however you should be able to function as you normally would after the procedure     *Mandatory COVID Testing:   Pt will need to complete a COVID test no sooner than 4 days prior to their procedure.      To schedule COVID testing Please call [phone]    If you want to complete this at an outside facility please call them to find out if they will have the results within the appropriate time frame and their fax number.  You will need to provide us  with that information so we can send them the order.    They will need to fax the results to [phone]    If you are running into and issues please call us.       Results:  Component      Latest Ref Rng & Units 3/15/2021   Sodium      133 - 144 mmol/L 138   Potassium      3.4 - 5.3 mmol/L 4.4   Chloride      94 - 109 mmol/L 104   Carbon Dioxide      20 - 32 mmol/L 30   Anion Gap      3 - 14 mmol/L 5   Glucose      70 - 99 mg/dL 103 (H)   Urea Nitrogen      7 - 30 mg/dL 15   Creatinine      0.66 - 1.25 mg/dL 1.11   GFR Estimate      >60 mL/min/1.73:m2 73   GFR Estimate If Black      >60 mL/min/1.73:m2 85   Calcium      8.5 - 10.1 mg/dL 9.1   WBC      4.0 - 11.0 10e9/L 7.4   RBC Count      4.4 - 5.9 10e12/L 4.07 (L)   Hemoglobin      13.3 - 17.7 g/dL 13.0 (L)   Hematocrit      40.0 - 53.0 % 41.7   MCV      78 - 100 fl 103 (H)   MCH      26.5 - 33.0 pg 31.9   MCHC      31.5 - 36.5 g/dL 31.2 (L)   RDW      10.0 - 15.0 % 14.6   Platelet Count      150 - 450 10e9/L 200   N-Terminal Pro Bnp      0 - 125 pg/mL 81     We are located on the third floor of the [facility] ([facility]) on the [facility].  Our address is     [address] [place] [address] on [address] [place] [address]    Thank you for allowing us to be a part of your care here at the [facility]    If you have questions or concerns please contact us at:    Hannah hTompson, RN, BSN, PHN  [facility] Chappell (Scheduling,Prior Auth)  Nurse Coordinator     Clinic   Pulmonary Hypertension   Pulmonary Hypertension  [facility]  (Phone)[phone]   (Phone) [phone]        (Fax) [phone]    ** Please note that you will NOT receive a reminder call regarding your scheduled testing, reminder calls are for provider appointments only.  If you are scheduled for testing within the [facility] system you may receive a call regarding  pre-registration for billing purposes only.**     Support Group:  Pulmonary Hypertension Association  Https://www.phassociation.org/  **Look at the Events Tab** They even have Support Groups that you can call into    [facility] PH Support Group  Second Saturday of the Month from 1-3 PM   Location: [address] [place] [address]  Leader: Adenike Fu  Phone: [phone] or [phone]  Email: [email or web address]

## 2021-03-15 NOTE — NURSING NOTE
Right Heart Catheterization: Patient was instructed regarding right heart catheterization, including discussion of the procedure, preparation, intra-procedural steps, and recovery at home. Patient demonstrated understanding of this information and agreed to call with further questions or concerns.  Labs: Patient was given results of the laboratory testing obtained today. Patient demonstrated understanding of this information and agreed to call with further questions or concerns.   Diet: Patient instructed regarding a heart healthy diet, including discussion of reduced fat and sodium intake. Patient demonstrated understanding of this information and agreed to call with further questions or concerns.  Return Appointment: Patient given instructions regarding scheduling next clinic visit. Patient demonstrated understanding of this information and agreed to call with further questions or concerns.  Patient stated he understood all health information given and agreed to call with further questions or concerns.    Staff message sent to Gómez to schedule RHC for June.Queenie Harley RN on 3/15/2021 at 3:48 PM    6 MWT and echo results faxed to Dr. Pop's office at (f) [phone]. Staff message sent to Hannah to fax OVN once signed. Queenie Harley RN on 3/15/2021 at 3:50 PM

## 2021-03-15 NOTE — LETTER
3/15/2021      RE: Giuseppe Linton  [id number]  [place] [address]       Dear Colleague,    Thank you for the opportunity to participate in the care of your patient, Giuseppe Linton, at the [facility] [place] [facility]. Please see a copy of my visit note below.    Service Date: 03/15/2021     Ghada Pop MD   [facility]   [address] [address]      RE: Giuseppe Linton   MRN: [id number]   :  1965      Dear Dr. Pop:      We had the pleasure of seeing . Giuseppe Linton in our Pulmonary Hypertension Clinic at the [facility].  As you know he is a 35-year-old male with moderate pulmonary hypertension likely multifactorial in the setting of emphysema, methamphetamine use, obesity, high output and mild diastolic dysfunction.  He returns today for follow-up.      We saw him in December for evaluation of pulmonary hypertension.  At that time, on repeat right heart catheterization he was found to have moderate pulmonary hypertension however his PVR was less than 3 Wood units.  He had high cardiac output with mildly elevated biventricular filling pressure.  In light of this, we did not start him on any pulmonary vasodilator therapy.  We recommended him to be managed conservatively with low salt diet, fluid restriction, higher dose of diuretics, supplemental oxygen therapy and CPAP therapy.  We also recommended him to continue to not use methamphetamine and stop smoking cigarettes.     Unfortunately, he was hospitalized in February at [facility] with Covid pneumonia.  He did not require mechanical ventilation however needed intensive care unit stay.  He was hospitalized for 9 days.  He has been home now for the last 2 weeks.  Since then, he has been having worsening exertional shortness of breath and his oxygen levels are dropping into the low 80s despite being  on 6 L of oxygen.  Before the Covid infection, he was feeling about the same as in December.  He has not had any worsening lower extremity swelling or abdominal distention.  His weight has been stable.  He denies having any exertional chest pain or chest pressure.  No exertional presyncope or syncope.    He is compliant with his CPAP therapy and supplemental oxygen.  He has been sober from using methamphetamines since July.  He has reduced his smoking significantly but still not completely quit.  He is highly motivated to quit smoking.       PAST MEDICAL HISTORY:   1.  Obesity.   2.  Recent diagnosis of obstructive sleep apnea.   3.  Methamphetamine use.      PAST SURGICAL HISTORY:  Not significant.      MEDICATIONS:  Current Outpatient Medications   Medication Sig     acetaminophen (TYLENOL) 325 MG tablet Take 325-650 mg by mouth every 6 hours as needed for mild pain     albuterol (PROAIR HFA/PROVENTIL HFA/VENTOLIN HFA) 108 (90 Base) MCG/ACT inhaler as needed     buPROPion (WELLBUTRIN SR) 150 MG 12 hr tablet 150 mg daily     furosemide (LASIX) 40 MG tablet 40 mg daily     Multiple Vitamin (MULTI VITAMIN  MENS) TABS Take 1 tablet by mouth daily     SPIRIVA RESPIMAT 2.5 MCG/ACT inhaler daily     SYMBICORT 160-4.5 MCG/ACT Inhaler daily     traZODone (DESYREL) 50 MG tablet daily     No current facility-administered medications for this visit.      Facility-Administered Medications Ordered in Other Visits   Medication     sodium chloride (PF) 0.9% PF flush 10 mL     REVIEW OF SYSTEMS:  A detailed 10-point review of systems was obtained as described in the History of Present Illness.  All other systems reviewed and are negative.      PERSONAL AND SOCIAL HISTORY:  He is not .  He has 3 grown kids who are healthy.  He lives alone.  He was a .  He has not been working now with the COVID and health conditions.  He has been smoking 1 pack a day since he was 14 years old.  He has been using meth for  the last 20 years.  He does use marijuana on and off.  He denies using cocaine recently, but has used in the remote past.  He does not use any other recreational drugs.  He has not used any diet pills to lose weight.      PHYSICAL EXAMINATION:    He was comfortable.  He was in no apparent distress.  He was awake, alert, oriented x3.    /77 (BP Location: Right arm, Patient Position: Chair, Cuff Size: Adult Large)   Pulse 114   Ht 1.829 m (6')   Wt 132 kg (291 lb)   SpO2 90%   BMI 39.47 kg/m    He had no pallor, cyanosis or jaundice.  His neck exam did not reveal any jugular venous distention.  His carotids were 1+ bilaterally.  His pulse was regular in rhythm.  Cardiac auscultation revealed normal S1, loud P2, sinus tachycardia, no murmur, rub or gallop.    Auscultation of his lungs revealed bilateral coarse breath sounds with scattered polyphonic wheeze. He had no crepitation.    His abdomen was soft with normal bowel sounds, no tenderness, no rigidity, no guarding.  He had no focal neurological deficit.    His extremities showed 1+ edema bilaterally.      CBC RESULTS:   Recent Labs   Lab Test 03/15/21  1229   WBC 7.4   RBC 4.07*   HGB 13.0*   HCT 41.7   *   MCH 31.9   MCHC 31.2*   RDW 14.6        Recent Labs   Lab Test 03/15/21  1229 12/30/20  0729    137   POTASSIUM 4.4 4.4   CHLORIDE 104 100   CO2 30 33*   ANIONGAP 5 4   * 113*   BUN 15 17   CR 1.11 1.04   VISHAL 9.1 9.7     Liver Function Studies -   Recent Labs   Lab Test 12/30/20  0729   PROTTOTAL 8.1   ALBUMIN 3.5   BILITOTAL 0.2   ALKPHOS 106   AST 24   ALT 30     No results found for: NTBNPI  Lab Results   Component Value Date    NTBNP 81 03/15/2021     His VQ scan showed small wedge-shaped mismatch perfusion defects in both lower lobes consistent with possible chronic thromboembolic pulmonary pretension.  He also has a matched perfusion defect in the right upper lobe in the area of the emphysematous.    He has had a CT  pulmonary angiogram showed no evidence of proximal chronic thromboembolic disease.  He had evidence of bilateral apical emphysema.  He also had a bilateral pulmonary nodule measuring 1.4 cm in size.  He had pleural calcifications.  He had no evidence of interstitial lung disease.      His pulmonary function test showed reduced FEV1/ FVC.ratio concerning for emphysema as well as significantly reduced lung volume likely due to restrictive physiology from his obesity. On her 6-minute walk test, he walked for 305 m.  He desaturated to the lower 80s despite being on 4 L of oxygen. CT scan of his chest showed emphysematous bullae on the right upper lobe.     RHC (12/2020)  His right heart catheterization showed an RA pressure of 10, RV of 52/50, PA of 49/29 with a mean of 39, pulmonary capillary wedge pressure of 60, measured Leonel cardiac output of 8.2 with an index of 3.2, thermodilution cardiac output of 7.9 with an index of 3.1, measured VO2 of 392 mL/min, SVR of thousand dynes/cm-5/sec, and a PVR of 2.7 Wood units.  He had no acute vasodilator response with inhaled nitric oxide.    Echocardiogram (03/2021)  Technically difficult study.Extremely poor acoustic windows.  Left ventricular size is normal.  The Ejection Fraction is estimated at 50-55%.  Mild right ventricular dilation is present.  Global right ventricular function is mildly reduced.  Pulmonary artery systolic pressure cannot be assessed.  PV AT 102ms.  IVC diameter and respiratory changes fall into an intermediate range  suggesting an RA pressure of 8 mmHg.  No pericardial effusion is present.  There is no prior study for direct comparison.      ASSESSMENT AND PLAN:     In summary, Mr. Giuseppe Linton is a 55-year-old gentleman with moderate pulmonary hypertension of multifactorial etiology including emphysema, methamphetamine use, obesity, high output state, and mild diastolic dysfunction who returns today for follow-up.      He is having increasing shortness  of breath.  He is desaturating on physical 6 L of oxygen.  His 6-minute walk distance have decreased.  His echo shows only mild right ventricular dilatation mild reduction in RV function.  His NT proBNP is within normal limits.  Clinically, he does not have any evidence of heart failure.  I suspect worsening exertional shortness of breath and hypoxemia is.his probably related to COVID-19 pneumonia in the setting of his underlying emphysema.     I am hesitant to start him on a pulmonary vasodilator therapy in this clinical setting especially when he had normal PVR and a high cardiac output 3 months ago.  Thus, we will continue to treat him with supportive measures.  He is euvolemic on Lasix 40 mg daily which I recommended him to continue.  I have recommended him to increase his supplemental oxygen to 8 L/min to maintain an oxygen saturation more than 90%.  He has a pulse ox and checks it regularly.  We also discussed the importance of low-salt diet, fluid restriction and routine use of CPAP machine.    We will repeat a right heart catheterization, 6-minute walk test and NT proBNP in 3 months time.  At the time of his repeat right heart catheterization, we will arrange for him to have a pulmonary angiogram as he had an abnormal VQ scan but his CT pulmonary angiogram showed no chronic thromboembolic disease.  I have recommended him to call us if he were to be having worsening symptoms in the interim.    He is currently being managed by pulmonology at [facility] for his emphysema with Dr. Ghada Pop.  He is on Symbicort and albuterol inhaler.  He is on supplemental oxygen therapy.  We discussed the importance of tobacco cessation and weight loss for potential lung transplant candidacy if he were to continue to get worse.     It was a pleasure meeting  Giuseppe Giovannadani in our Pulmonary Hypertension Clinic at the [facility].  We thank you for involving us in his care.  Please do not hesitate  to call us in the interim if you have any further questions.      Sincerely,   Thiago Das MD   [facility]  Heart Failure, Transplant, and Mechanical Circulatory Support Cardiology   Cardiovascular Division  [facility]   [phone]

## 2021-03-15 NOTE — PROGRESS NOTES
Service Date: 03/15/2021     Ghada Pop MD   [facility]   [address], [place] [id number]      RE: Giuseppe Linton   MRN: [id number]   :  1965      Dear Dr. Pop:      We had the pleasure of seeing Mr. Giuseppe Linton in our Pulmonary Hypertension Clinic at the [facility].  As you know he is a 35-year-old male with moderate pulmonary hypertension likely multifactorial in the setting of emphysema, methamphetamine use, obesity, high output and mild diastolic dysfunction.  He returns today for follow-up.      We saw him in December for evaluation of pulmonary hypertension.  At that time, on repeat right heart catheterization he was found to have moderate pulmonary hypertension however his PVR was less than 3 Wood units.  He had high cardiac output with mildly elevated biventricular filling pressure.  In light of this, we did not start him on any pulmonary vasodilator therapy.  We recommended him to be managed conservatively with low salt diet, fluid restriction, higher dose of diuretics, supplemental oxygen therapy and CPAP therapy.  We also recommended him to continue to not use methamphetamine and stop smoking cigarettes.     Unfortunately, he was hospitalized in February at [facility] with Covid pneumonia.  He did not require mechanical ventilation however needed intensive care unit stay.  He was hospitalized for 9 days.  He has been home now for the last 2 weeks.  Since then, he has been having worsening exertional shortness of breath and his oxygen levels are dropping into the low 80s despite being on 6 L of oxygen.  Before the Covid infection, he was feeling about the same as in December.  He has not had any worsening lower extremity swelling or abdominal distention.  His weight has been stable.  He denies having any exertional chest pain or chest pressure.  No exertional presyncope or syncope.    He is compliant with his  CPAP therapy and supplemental oxygen.  He has been sober from using methamphetamines since July.  He has reduced his smoking significantly but still not completely quit.  He is highly motivated to quit smoking.       PAST MEDICAL HISTORY:   1.  Obesity.   2.  Recent diagnosis of obstructive sleep apnea.   3.  Methamphetamine use.      PAST SURGICAL HISTORY:  Not significant.      MEDICATIONS:  Current Outpatient Medications   Medication Sig     acetaminophen (TYLENOL) 325 MG tablet Take 325-650 mg by mouth every 6 hours as needed for mild pain     albuterol (PROAIR HFA/PROVENTIL HFA/VENTOLIN HFA) 108 (90 Base) MCG/ACT inhaler as needed     buPROPion (WELLBUTRIN SR) 150 MG 12 hr tablet 150 mg daily     furosemide (LASIX) 40 MG tablet 40 mg daily     Multiple Vitamin (MULTI VITAMIN  MENS) TABS Take 1 tablet by mouth daily     SPIRIVA RESPIMAT 2.5 MCG/ACT inhaler daily     SYMBICORT 160-4.5 MCG/ACT Inhaler daily     traZODone (DESYREL) 50 MG tablet daily     No current facility-administered medications for this visit.      Facility-Administered Medications Ordered in Other Visits   Medication     sodium chloride (PF) 0.9% PF flush 10 mL     REVIEW OF SYSTEMS:  A detailed 10-point review of systems was obtained as described in the History of Present Illness.  All other systems reviewed and are negative.      PERSONAL AND SOCIAL HISTORY:  He is not .  He has 3 grown kids who are healthy.  He lives alone.  He was a .  He has not been working now with the COVID and health conditions.  He has been smoking 1 pack a day since he was 14 years old.  He has been using meth for the last 20 years.  He does use marijuana on and off.  He denies using cocaine recently, but has used in the remote past.  He does not use any other recreational drugs.  He has not used any diet pills to lose weight.      PHYSICAL EXAMINATION:    He was comfortable.  He was in no apparent distress.  He was awake, alert, oriented  x3.    /77 (BP Location: Right arm, Patient Position: Chair, Cuff Size: Adult Large)   Pulse 114   Ht 1.829 m (6')   Wt 132 kg (291 lb)   SpO2 90%   BMI 39.47 kg/m    He had no pallor, cyanosis or jaundice.  His neck exam did not reveal any jugular venous distention.  His carotids were 1+ bilaterally.  His pulse was regular in rhythm.  Cardiac auscultation revealed normal S1, loud P2, sinus tachycardia, no murmur, rub or gallop.    Auscultation of his lungs revealed bilateral coarse breath sounds with scattered polyphonic wheeze. He had no crepitation.    His abdomen was soft with normal bowel sounds, no tenderness, no rigidity, no guarding.  He had no focal neurological deficit.    His extremities showed 1+ edema bilaterally.      CBC RESULTS:   Recent Labs   Lab Test 03/15/21  1229   WBC 7.4   RBC 4.07*   HGB 13.0*   HCT 41.7   *   MCH 31.9   MCHC 31.2*   RDW 14.6        Recent Labs   Lab Test 03/15/21  1229 12/30/20  0729    137   POTASSIUM 4.4 4.4   CHLORIDE 104 100   CO2 30 33*   ANIONGAP 5 4   * 113*   BUN 15 17   CR 1.11 1.04   VISHAL 9.1 9.7     Liver Function Studies -   Recent Labs   Lab Test 12/30/20  0729   PROTTOTAL 8.1   ALBUMIN 3.5   BILITOTAL 0.2   ALKPHOS 106   AST 24   ALT 30     No results found for: NTBNPI  Lab Results   Component Value Date    NTBNP 81 03/15/2021     His VQ scan showed small wedge-shaped mismatch perfusion defects in both lower lobes consistent with possible chronic thromboembolic pulmonary pretension.  He also has a matched perfusion defect in the right upper lobe in the area of the emphysematous.    He has had a CT pulmonary angiogram showed no evidence of proximal chronic thromboembolic disease.  He had evidence of bilateral apical emphysema.  He also had a bilateral pulmonary nodule measuring 1.4 cm in size.  He had pleural calcifications.  He had no evidence of interstitial lung disease.      His pulmonary function test showed reduced  FEV1/ FVC.ratio concerning for emphysema as well as significantly reduced lung volume likely due to restrictive physiology from his obesity. On her 6-minute walk test, he walked for 305 m.  He desaturated to the lower 80s despite being on 4 L of oxygen. CT scan of his chest showed emphysematous bullae on the right upper lobe.     RHC (12/2020)  His right heart catheterization showed an RA pressure of 10, RV of 52/50, PA of 49/29 with a mean of 39, pulmonary capillary wedge pressure of 60, measured Leonel cardiac output of 8.2 with an index of 3.2, thermodilution cardiac output of 7.9 with an index of 3.1, measured VO2 of 392 mL/min, SVR of thousand dynes/cm-5/sec, and a PVR of 2.7 Wood units.  He had no acute vasodilator response with inhaled nitric oxide.    Echocardiogram (03/2021)  Technically difficult study.Extremely poor acoustic windows.  Left ventricular size is normal.  The Ejection Fraction is estimated at 50-55%.  Mild right ventricular dilation is present.  Global right ventricular function is mildly reduced.  Pulmonary artery systolic pressure cannot be assessed.  PV AT 102ms.  IVC diameter and respiratory changes fall into an intermediate range  suggesting an RA pressure of 8 mmHg.  No pericardial effusion is present.  There is no prior study for direct comparison.      ASSESSMENT AND PLAN:     In summary, Mr. Giuseppe Linton is a 55-year-old gentleman with moderate pulmonary hypertension of multifactorial etiology including emphysema, methamphetamine use, obesity, high output state, and mild diastolic dysfunction who returns today for follow-up.      He is having increasing shortness of breath.  He is desaturating on physical 6 L of oxygen.  His 6-minute walk distance have decreased.  His echo shows only mild right ventricular dilatation mild reduction in RV function.  His NT proBNP is within normal limits.  Clinically, he does not have any evidence of heart failure.  I suspect worsening exertional  shortness of breath and hypoxemia is.his probably related to COVID-19 pneumonia in the setting of his underlying emphysema.     I am hesitant to start him on a pulmonary vasodilator therapy in this clinical setting especially when he had normal PVR and a high cardiac output 3 months ago.  Thus, we will continue to treat him with supportive measures.  He is euvolemic on Lasix 40 mg daily which I recommended him to continue.  I have recommended him to increase his supplemental oxygen to 8 L/min to maintain an oxygen saturation more than 90%.  He has a pulse ox and checks it regularly.  We also discussed the importance of low-salt diet, fluid restriction and routine use of CPAP machine.    We will repeat a right heart catheterization, 6-minute walk test and NT proBNP in 3 months time.  At the time of his repeat right heart catheterization, we will arrange for him to have a pulmonary angiogram as he had an abnormal VQ scan but his CT pulmonary angiogram showed no chronic thromboembolic disease.  I have recommended him to call us if he were to be having worsening symptoms in the interim.    He is currently being managed by pulmonology at [facility] for his emphysema with Dr. Ghada Pop.  He is on Symbicort and albuterol inhaler.  He is on supplemental oxygen therapy.  We discussed the importance of tobacco cessation and weight loss for potential lung transplant candidacy if he were to continue to get worse.     It was a pleasure meeting Mr. Giuseppe Linton in our Pulmonary Hypertension Clinic at the [facility].  We thank you for involving us in his care.  Please do not hesitate to call us in the interim if you have any further questions.      Sincerely,   Thiago Das MD   [facility]  Heart Failure, Transplant, and Mechanical Circulatory Support Cardiology   Cardiovascular Division  [facility]   [phone]

## 2021-03-17 ENCOUNTER — TELEPHONE (OUTPATIENT)
Dept: CARDIOLOGY | Facility: CLINIC | Age: 56
End: 2021-03-17

## 2021-03-17 PROBLEM — I51.89 OTHER ILL-DEFINED HEART DISEASES: Status: ACTIVE | Noted: 2021-03-17

## 2021-03-17 RX ORDER — POTASSIUM CHLORIDE 1500 MG/1
40 TABLET, EXTENDED RELEASE ORAL
Status: CANCELLED | OUTPATIENT
Start: 2021-03-17

## 2021-03-17 RX ORDER — LIDOCAINE 40 MG/G
CREAM TOPICAL
Status: CANCELLED | OUTPATIENT
Start: 2021-03-17

## 2021-03-17 RX ORDER — POTASSIUM CHLORIDE 1500 MG/1
20 TABLET, EXTENDED RELEASE ORAL
Status: CANCELLED | OUTPATIENT
Start: 2021-03-17

## 2021-03-17 RX ORDER — SODIUM CHLORIDE 9 MG/ML
INJECTION, SOLUTION INTRAVENOUS CONTINUOUS
Status: CANCELLED | OUTPATIENT
Start: 2021-03-17

## 2021-03-17 NOTE — NURSING NOTE
From: Thiago Das MD   Sent: 3/15/2021   5:28 PM CDT   To: Genesis Chappell, Cardiology Ph Nurse-     Team:     I would appreciate it if you could add a pulmonary angiogram to his RHC when he returns in 3 months. Either Dr. Klein or Deedee will be great.     Thank you     Sincerely,   Thiago

## 2021-03-17 NOTE — TELEPHONE ENCOUNTER
----- Message from Queenie Harley RN sent at 3/15/2021  3:50 PM CDT -----  Regarding: RHC in June with TT  Hi ladies,    Patient will need a RHC with Dr. Das in June. Orders placed and pre-procedure instructions reviewed with the patient.     Hannah, he also asked that we send the OVN from his appointment to Dr. Pop, his pulmonologist, at (f) [phone]. I have already faxed over the 6MWT and echo results today.    Thank you!  Oleg    ------------------------------------  Faxed OV note dated 3/15/21 to Dr. Pop @ above number. Hannah Thompson RN on 3/17/2021 at 5:10 PM

## 2021-03-22 ENCOUNTER — TRANSFERRED RECORDS (OUTPATIENT)
Dept: HEALTH INFORMATION MANAGEMENT | Facility: CLINIC | Age: 56
End: 2021-03-22

## 2021-04-03 ENCOUNTER — HEALTH MAINTENANCE LETTER (OUTPATIENT)
Age: 56
End: 2021-04-03

## 2021-06-04 DIAGNOSIS — Z11.59 ENCOUNTER FOR SCREENING FOR OTHER VIRAL DISEASES: ICD-10-CM

## 2021-07-19 DIAGNOSIS — Z11.59 ENCOUNTER FOR SCREENING FOR OTHER VIRAL DISEASES: ICD-10-CM

## 2021-09-04 ENCOUNTER — TELEPHONE (OUTPATIENT)
Dept: CARDIOLOGY | Facility: CLINIC | Age: 56
End: 2021-09-04

## 2021-09-04 NOTE — TELEPHONE ENCOUNTER
----- Message from Hannah Thompson RN sent at 7/19/2021  2:33 PM CDT -----  Regarding: Pre=Procedure education  Call & review pre-procedure education including pre-covid testing & holding blood thinners.  ----- Message -----  From: Genesis Chappell  Sent: 7/19/2021   1:50 PM CDT  To: MANN Ross,    I have the patient rescheduled for his RHC/PA on 9/10. Please postpone to review pre-procedure.    His visit is scheduled in person on 9/27 w/ TT.    Thank you,  -Genesis  --------------------------------  Sent [employer] message to patient with instructions.  Will plan on calling Tuesday 9/7/21. Hannah Thompson RN on 9/4/2021 at 10:34 AM

## 2021-09-07 ENCOUNTER — LAB (OUTPATIENT)
Dept: LAB | Facility: CLINIC | Age: 56
End: 2021-09-07
Payer: COMMERCIAL

## 2021-09-07 DIAGNOSIS — Z11.59 ENCOUNTER FOR SCREENING FOR OTHER VIRAL DISEASES: ICD-10-CM

## 2021-09-07 PROCEDURE — U0003 INFECTIOUS AGENT DETECTION BY NUCLEIC ACID (DNA OR RNA); SEVERE ACUTE RESPIRATORY SYNDROME CORONAVIRUS 2 (SARS-COV-2) (CORONAVIRUS DISEASE [COVID-19]), AMPLIFIED PROBE TECHNIQUE, MAKING USE OF HIGH THROUGHPUT TECHNOLOGIES AS DESCRIBED BY CMS-2020-01-R: HCPCS

## 2021-09-07 PROCEDURE — U0005 INFEC AGEN DETEC AMPLI PROBE: HCPCS

## 2021-09-08 LAB — SARS-COV-2 RNA RESP QL NAA+PROBE: NEGATIVE

## 2021-09-09 ENCOUNTER — TELEPHONE (OUTPATIENT)
Dept: CARDIOLOGY | Facility: CLINIC | Age: 56
End: 2021-09-09

## 2021-09-09 NOTE — TELEPHONE ENCOUNTER
Left voicemail for patient to complete Travel Screen for Cardiac Cath Lab appointment on 9/10 and inform patient of updated Visitor Policy.       covid neg

## 2021-09-10 ENCOUNTER — APPOINTMENT (OUTPATIENT)
Dept: MEDSURG UNIT | Facility: CLINIC | Age: 56
End: 2021-09-10
Attending: INTERNAL MEDICINE
Payer: COMMERCIAL

## 2021-09-10 ENCOUNTER — APPOINTMENT (OUTPATIENT)
Dept: LAB | Facility: CLINIC | Age: 56
End: 2021-09-10
Attending: INTERNAL MEDICINE
Payer: COMMERCIAL

## 2021-09-10 ENCOUNTER — HOSPITAL ENCOUNTER (OUTPATIENT)
Facility: CLINIC | Age: 56
Discharge: HOME | End: 2021-09-10
Attending: INTERNAL MEDICINE | Admitting: INTERNAL MEDICINE
Payer: COMMERCIAL

## 2021-09-10 ENCOUNTER — TELEPHONE (OUTPATIENT)
Dept: CARDIOLOGY | Facility: CLINIC | Age: 56
End: 2021-09-10

## 2021-09-10 VITALS
SYSTOLIC BLOOD PRESSURE: 149 MMHG | DIASTOLIC BLOOD PRESSURE: 88 MMHG | RESPIRATION RATE: 18 BRPM | TEMPERATURE: 98.1 F | OXYGEN SATURATION: 97 % | HEART RATE: 99 BPM

## 2021-09-10 DIAGNOSIS — I27.20 PULMONARY HYPERTENSION (H): Primary | ICD-10-CM

## 2021-09-10 DIAGNOSIS — I27.20 PULMONARY HYPERTENSION (H): ICD-10-CM

## 2021-09-10 DIAGNOSIS — I51.89 OTHER ILL-DEFINED HEART DISEASES: ICD-10-CM

## 2021-09-10 DIAGNOSIS — R06.02 SOB (SHORTNESS OF BREATH): ICD-10-CM

## 2021-09-10 LAB
ANION GAP SERPL CALCULATED.3IONS-SCNC: 7 MMOL/L (ref 3–14)
BUN SERPL-MCNC: 17 MG/DL (ref 7–30)
CALCIUM SERPL-MCNC: 9.5 MG/DL (ref 8.5–10.1)
CHLORIDE BLD-SCNC: 101 MMOL/L (ref 94–109)
CO2 SERPL-SCNC: 29 MMOL/L (ref 20–32)
CREAT SERPL-MCNC: 1.04 MG/DL (ref 0.66–1.25)
ERYTHROCYTE [DISTWIDTH] IN BLOOD BY AUTOMATED COUNT: 15.4 % (ref 10–15)
GFR SERPL CREATININE-BSD FRML MDRD: 79 ML/MIN/1.73M2
GLUCOSE BLD-MCNC: 121 MG/DL (ref 70–99)
HCT VFR BLD AUTO: 41.1 % (ref 40–53)
HGB BLD-MCNC: 12.9 G/DL (ref 13.3–17.7)
HGB BLD-MCNC: 12.9 G/DL (ref 13.3–17.7)
HGB BLD-MCNC: 13.2 G/DL (ref 13.3–17.7)
MCH RBC QN AUTO: 30.7 PG (ref 26.5–33)
MCHC RBC AUTO-ENTMCNC: 31.4 G/DL (ref 31.5–36.5)
MCV RBC AUTO: 98 FL (ref 78–100)
NT-PROBNP SERPL-MCNC: 10 PG/ML (ref 0–900)
OXYHGB MFR BLDV: 69 % (ref 92–100)
OXYHGB MFR BLDV: 96 % (ref 92–100)
PLATELET # BLD AUTO: 263 10E3/UL (ref 150–450)
POTASSIUM BLD-SCNC: 4.5 MMOL/L (ref 3.4–5.3)
RBC # BLD AUTO: 4.2 10E6/UL (ref 4.4–5.9)
SODIUM SERPL-SCNC: 137 MMOL/L (ref 133–144)
WBC # BLD AUTO: 7.9 10E3/UL (ref 4–11)

## 2021-09-10 PROCEDURE — 93451 RIGHT HEART CATH: CPT | Performed by: INTERNAL MEDICINE

## 2021-09-10 PROCEDURE — 85014 HEMATOCRIT: CPT | Performed by: INTERNAL MEDICINE

## 2021-09-10 PROCEDURE — 85018 HEMOGLOBIN: CPT

## 2021-09-10 PROCEDURE — 36415 COLL VENOUS BLD VENIPUNCTURE: CPT | Performed by: INTERNAL MEDICINE

## 2021-09-10 PROCEDURE — 250N000009 HC RX 250: Performed by: INTERNAL MEDICINE

## 2021-09-10 PROCEDURE — 93568 NJX CAR CTH NSLC P-ART ANGRP: CPT | Performed by: INTERNAL MEDICINE

## 2021-09-10 PROCEDURE — 83880 ASSAY OF NATRIURETIC PEPTIDE: CPT | Performed by: INTERNAL MEDICINE

## 2021-09-10 PROCEDURE — 999N000142 HC STATISTIC PROCEDURE PREP ONLY

## 2021-09-10 PROCEDURE — 80048 BASIC METABOLIC PNL TOTAL CA: CPT | Performed by: INTERNAL MEDICINE

## 2021-09-10 PROCEDURE — 250N000011 HC RX IP 250 OP 636: Performed by: INTERNAL MEDICINE

## 2021-09-10 PROCEDURE — 82810 BLOOD GASES O2 SAT ONLY: CPT

## 2021-09-10 PROCEDURE — 272N000001 HC OR GENERAL SUPPLY STERILE: Performed by: INTERNAL MEDICINE

## 2021-09-10 RX ORDER — SODIUM CHLORIDE 9 MG/ML
INJECTION, SOLUTION INTRAVENOUS CONTINUOUS
Status: DISCONTINUED | OUTPATIENT
Start: 2021-09-10 | End: 2021-09-10 | Stop reason: HOSPADM

## 2021-09-10 RX ORDER — LIDOCAINE 40 MG/G
CREAM TOPICAL
Status: DISCONTINUED | OUTPATIENT
Start: 2021-09-10 | End: 2021-09-10 | Stop reason: HOSPADM

## 2021-09-10 RX ORDER — POTASSIUM CHLORIDE 750 MG/1
20 TABLET, EXTENDED RELEASE ORAL
Status: DISCONTINUED | OUTPATIENT
Start: 2021-09-10 | End: 2021-09-10 | Stop reason: HOSPADM

## 2021-09-10 RX ORDER — FUROSEMIDE 40 MG
40 TABLET ORAL 2 TIMES DAILY
Qty: 180 TABLET | Refills: 3 | Status: SHIPPED | OUTPATIENT
Start: 2021-09-10 | End: 2022-03-08

## 2021-09-10 RX ORDER — SPIRONOLACTONE 25 MG/1
25 TABLET ORAL DAILY
COMMUNITY

## 2021-09-10 RX ORDER — IOPAMIDOL 755 MG/ML
INJECTION, SOLUTION INTRAVASCULAR
Status: DISCONTINUED | OUTPATIENT
Start: 2021-09-10 | End: 2021-09-10 | Stop reason: HOSPADM

## 2021-09-10 RX ORDER — POTASSIUM CHLORIDE 750 MG/1
40 TABLET, EXTENDED RELEASE ORAL
Status: DISCONTINUED | OUTPATIENT
Start: 2021-09-10 | End: 2021-09-10 | Stop reason: HOSPADM

## 2021-09-10 RX ADMIN — LIDOCAINE: 40 CREAM TOPICAL at 12:18

## 2021-09-10 NOTE — PROGRESS NOTES
Patient tolerated recovery stage well. VSS, R neck site clean/dry/intact, no hematoma, and denies pain. Patient tolerated PO food and fluids. Teaching was done and discharge instructions were given. Patient discharged to home independently.

## 2021-09-10 NOTE — PROGRESS NOTES
2A prep for RHC and Pulm Angio. VSS. On 6L NC baseline. Denies pain. Approprietly NPO. Lidocaine to right neck. Prep questions complete.

## 2021-09-10 NOTE — PRE-PROCEDURE
GENERAL PRE-PROCEDURE:   Procedure:  Right heart catheterization and pulmonary angiogram  Date/Time:  9/10/2021 1:00 PM    Written consent obtained?: Yes    Risks and benefits: Risks, benefits and alternatives were discussed    DC Plan: Appropriate discharge home plan in place for patients who are going home after procedure   Consent given by:  Patient  Patient states understanding of procedure being performed: Yes    Patient's understanding of procedure matches consent: Yes    Procedure consent matches procedure scheduled: Yes    Appropriately NPO:  Yes  Lungs:  Lungs clear with good breath sounds bilaterally  Heart:  Normal heart sounds and rate

## 2021-09-10 NOTE — TELEPHONE ENCOUNTER
----- Message from Queenie Harley RN sent at 9/10/2021  3:03 PM CDT -----    ----- Message -----  From: Thiago Das MD  Sent: 9/10/2021   2:53 PM CDT  To: Cardiology Ph Nurse-    Team,    He just completed his pulmonary angiogram and RHC.    Plan:  1.Increase his lasix to 40 mg bid.   2. Repeat BMP when he sees me on 9/27    No other changes.     Thank you    TT

## 2021-09-10 NOTE — DISCHARGE INSTRUCTIONS
[facility]                        Interventional Cardiology  Discharge Instructions   Post Right Heart Cath/Pulm Angiogram      AFTER YOU GO HOME:    DO drink plenty of fluids    DO resume your regular diet and medications unless otherwise instructed by your Primary Physician    Do Not scrub the procedure site vigorously    No lotion or powder to the puncture site for 3 days    CALL YOUR PRIMARY PHYSICIAN IF: You may resume all normal activity.  Monitor neck site for bleeding, swelling, or voice changes. If you notice bleeding or swelling immediately apply pressure to the site and call number below to speak with Cardiology Fellow.  If you experience any changes in your breathing you should call your doctor immediately or come to the closest Emergency Department.  Do not drive yourself.    ADDITIONAL INSTRUCTIONS: Medications: You are to resume all home medications including anticoagulation therapy unless otherwise advised by your primary cardiologist or nurse coordinator.    Follow Up: Per your primary cardiology team    If you have any questions or concerns regarding your procedure site please call [phone] at anytime and ask for Cardiology Fellow on call.  They are available 24 hours a day.  You may also contact the Cardiology Clinic after hours number at [phone].                                                       Telephone Numbers [phone] Monday-Friday 8:00 am to 4:30 pm    [phone] After 4:30 pm Monday-Friday, Weekends & Holidays  Ask for Interventional Cardiologist on call. Someone is on call 24 hours/day   [facility] toll free number [phone] Monday-Friday 8:00 am to 4:30 pm   [facility] Emergency Dept [phone]

## 2021-09-18 ENCOUNTER — HEALTH MAINTENANCE LETTER (OUTPATIENT)
Age: 56
End: 2021-09-18

## 2021-09-27 ENCOUNTER — LAB (OUTPATIENT)
Dept: LAB | Facility: CLINIC | Age: 56
End: 2021-09-27
Attending: INTERNAL MEDICINE
Payer: COMMERCIAL

## 2021-09-27 ENCOUNTER — OFFICE VISIT (OUTPATIENT)
Dept: CARDIOLOGY | Facility: CLINIC | Age: 56
End: 2021-09-27
Attending: INTERNAL MEDICINE
Payer: COMMERCIAL

## 2021-09-27 VITALS
BODY MASS INDEX: 40.96 KG/M2 | OXYGEN SATURATION: 93 % | SYSTOLIC BLOOD PRESSURE: 128 MMHG | WEIGHT: 302 LBS | DIASTOLIC BLOOD PRESSURE: 78 MMHG | HEART RATE: 99 BPM

## 2021-09-27 DIAGNOSIS — I27.20 PULMONARY HYPERTENSION (H): Primary | ICD-10-CM

## 2021-09-27 DIAGNOSIS — R06.02 SOB (SHORTNESS OF BREATH): ICD-10-CM

## 2021-09-27 DIAGNOSIS — I27.20 PULMONARY HYPERTENSION (H): ICD-10-CM

## 2021-09-27 LAB
ANION GAP SERPL CALCULATED.3IONS-SCNC: 6 MMOL/L (ref 3–14)
BUN SERPL-MCNC: 15 MG/DL (ref 7–30)
CALCIUM SERPL-MCNC: 9.4 MG/DL (ref 8.5–10.1)
CHLORIDE BLD-SCNC: 105 MMOL/L (ref 94–109)
CO2 SERPL-SCNC: 29 MMOL/L (ref 20–32)
CREAT SERPL-MCNC: 1.13 MG/DL (ref 0.66–1.25)
GFR SERPL CREATININE-BSD FRML MDRD: 72 ML/MIN/1.73M2
GLUCOSE BLD-MCNC: 140 MG/DL (ref 70–99)
NT-PROBNP SERPL-MCNC: 10 PG/ML (ref 0–125)
POTASSIUM BLD-SCNC: 4.3 MMOL/L (ref 3.4–5.3)
SODIUM SERPL-SCNC: 140 MMOL/L (ref 133–144)

## 2021-09-27 PROCEDURE — 80048 BASIC METABOLIC PNL TOTAL CA: CPT | Performed by: PATHOLOGY

## 2021-09-27 PROCEDURE — G0463 HOSPITAL OUTPT CLINIC VISIT: HCPCS

## 2021-09-27 PROCEDURE — 36415 COLL VENOUS BLD VENIPUNCTURE: CPT | Performed by: PATHOLOGY

## 2021-09-27 PROCEDURE — 83880 ASSAY OF NATRIURETIC PEPTIDE: CPT | Performed by: PATHOLOGY

## 2021-09-27 PROCEDURE — 99215 OFFICE O/P EST HI 40 MIN: CPT | Mod: GC | Performed by: INTERNAL MEDICINE

## 2021-09-27 ASSESSMENT — PAIN SCALES - GENERAL: PAINLEVEL: NO PAIN (0)

## 2021-09-27 NOTE — PROGRESS NOTES
Service Date: 2021    Ghada Pop MD   [facility]   [address], [place] [address]     RE:  Giuseppe Linton   MRN:  [id number]  :  1965      Dear Dr. Pop:     We had the pleasure of seeing Giuseppe Linton at the [facility].  As you know, Mr. Linton is a very pleasant 56 year old male with moderate pulmonary hypertension that is likely multifactorial in the setting of emphysema, methamphetamine use, obesity, high output and mild diastolic dysfunction.  He returns today for 6 month follow-up.      Since he was last seen in our clinic, he had a right heart catheterization and pulmonary angiogram since his nuclear medicine lung perfusion scan showed small subsegmental wedge-shaped perfusion deficits in the bilateral lower lobes.  He was found to have moderate postcapillary pulmonary hypertension with mean PA pressure 35 mmHg, PCWP 21, preserved cardiac index, and PVR 1.8 GARDNER.  Per our review, pulmonary angiogram showed segmental/subsegmental lesions in right A1/A2 that correspond to an area of emphysematous bleb.    He notes overall feeling stable from a cardiopulmonary perspective.  He has not noticed any worsening in his breathing since he was last seen in in our clinic.  However, he feels that his breathing is worse after he got COVID-19 pneumonia in February compared to prior to his COVID-19 infection.  He stopped smoking 2.5 months ago.  He has been participating in pulmonary rehab.  We would categorize him as NYHA functional class III.  Denies exertional chest pain, syncope, presyncope, and palpitations.  He has some orthopnea but only needs to sleep on 1 pillow.  He is currently only taking lasix 40 mg daily.     PAST MEDICAL HISTORY:  Past Medical History:   Diagnosis Date     CHF (congestive heart failure) (H)      Cor pulmonale (chronic) (H)      Depressive disorder, not elsewhere classified       H/O respiratory failure      Hyponatremia      Pulmonary hypertension (H)        PAST SURGICAL HISTORY:  Past Surgical History:   Procedure Laterality Date     CV PULMONARY ANGIOGRAM N/A 9/10/2021     CV RIGHT HEART CATH MEASUREMENTS RECORDED N/A 2020     CV RIGHT HEART CATH MEASUREMENTS RECORDED N/A 9/10/2021       SOCIAL HISTORY:  Social History     Socioeconomic History     Marital status: Single     Spouse name: Not on file     Number of children: Not on file     Years of education: Not on file     Highest education level: Not on file   Occupational History     Not on file   Tobacco Use     Smoking status: Current Some Day Smoker     Last attempt to quit: 10/2020     Years since quittin.9     Smokeless tobacco: Current User   Substance and Sexual Activity     Alcohol use: Not on file     Drug use: Not on file     Sexual activity: Not Currently   Other Topics Concern     Parent/sibling w/ CABG, MI or angioplasty before 65F 55M? Not Asked   Social History Narrative     Not on file     Social Determinants of Health     Financial Resource Strain:      Difficulty of Paying Living Expenses:    Food Insecurity:      Worried About Running Out of Food in the Last Year:      Ran Out of Food in the Last Year:    Transportation Needs:      Lack of Transportation (Medical):      Lack of Transportation (Non-Medical):    Physical Activity:      Days of Exercise per Week:      Minutes of Exercise per Session:    Stress:      Feeling of Stress :    Social Connections:      Frequency of Communication with Friends and Family:      Frequency of Social Gatherings with Friends and Family:      Attends [employer] Services:      Active Member of Clubs or Organizations:      Attends Club or Organization Meetings:      Marital Status:    Intimate Partner Violence:      Fear of Current or Ex-Partner:      Emotionally Abused:      Physically Abused:      Sexually Abused:        CURRENT MEDICATIONS:  Current Outpatient Medications    Medication Sig     acetaminophen (TYLENOL) 325 MG tablet Take 325-650 mg by mouth every 6 hours as needed for mild pain     albuterol (PROAIR HFA/PROVENTIL HFA/VENTOLIN HFA) 108 (90 Base) MCG/ACT inhaler as needed     buPROPion (WELLBUTRIN SR) 150 MG 12 hr tablet 150 mg daily     furosemide (LASIX) 40 MG tablet Take 1 tablet (40 mg) by mouth 2 times daily     Multiple Vitamin (MULTI VITAMIN  MENS) TABS Take 1 tablet by mouth daily     SPIRIVA RESPIMAT 2.5 MCG/ACT inhaler daily     spironolactone (ALDACTONE) 25 MG tablet Take 25 mg by mouth daily     SYMBICORT 160-4.5 MCG/ACT Inhaler daily     traZODone (DESYREL) 50 MG tablet daily     No current facility-administered medications for this visit.     Facility-Administered Medications Ordered in Other Visits   Medication     sodium chloride (PF) 0.9% PF flush 10 mL       ROS:   10 point ROS negative except as discussed in above HPI.    EXAM:  /78 (BP Location: Right arm, Patient Position: Chair, Cuff Size: Adult Large)   Pulse 99   Wt 137 kg (302 lb)   SpO2 93%   BMI 40.96 kg/m    General: appears comfortable, alert and articulate  Head: normocephalic, atraumatic  Eyes: anicteric sclera, EOMI  Neck: no adenopathy  Orophyarynx: moist mucosa, no lesions, dentition intact  Heart: regular, normal S1/S2, no murmur, gallop, rub, difficult to appreciate JVP, 2+ radial and carotid pulses  Lungs: clear to auscultation bilaterally, no rales or wheezing  Abdomen: soft, non-tender, bowel sounds present, no hepatosplenomegaly  Extremities: clubbing present, 2+ bilateral lower extremity pitting edema to the proximal tibia  Neurological: normal speech and affect, no gross motor deficits    Labs:  Recent Results (from the past 48 hour(s))   Basic metabolic panel    Collection Time: 09/27/21  9:09 AM   Result Value Ref Range    Sodium 140 133 - 144 mmol/L    Potassium 4.3 3.4 - 5.3 mmol/L    Chloride 105 94 - 109 mmol/L    Carbon Dioxide (CO2) 29 20 - 32 mmol/L    Anion Gap  6 3 - 14 mmol/L    Urea Nitrogen 15 7 - 30 mg/dL    Creatinine 1.13 0.66 - 1.25 mg/dL    Calcium 9.4 8.5 - 10.1 mg/dL    Glucose 140 (H) 70 - 99 mg/dL    GFR Estimate 72 >60 mL/min/1.73m2   N terminal pro BNP outpatient    Collection Time: 09/27/21  9:09 AM   Result Value Ref Range    N Terminal Pro BNP Outpatient 10 0 - 125 pg/mL         Echocardiogram 3/15/21:  Technically difficult study.Extremely poor acoustic windows.  Left ventricular size is normal.  The Ejection Fraction is estimated at 50-55%.  Mild right ventricular dilation is present.  Global right ventricular function is mildly reduced.  Pulmonary artery systolic pressure cannot be assessed.  PV AT 102ms.  IVC diameter and respiratory changes fall into an intermediate range  suggesting an RA pressure of 8 mmHg.  No pericardial effusion is present.  There is no prior study for direct comparison.    RHC and pulmonary angiogram 9/10/21:  RA: 15  RV: 52/15  PA: 52/27 (35)  PCWP: 21  TD CO/CI: 7.7/3.1  Leonel CO/CI: 8.0/3.2  PVR: 1.8    Per our review, pulmonary angiogram showed segmental/subsegmental lesions in right A1/A2 that correspond to an area of emphysematous bleb.    NM lung perfusion scan 12/30/20:  1. Small subsegmental wedge-shaped perfusion deficits in the lower  lobes bilaterally, technically age-indeterminant. If the D-dimer is  not elevated, this can support the diagnosis of chronic thromboembolic  disease.   2. Mild cardiomegaly.  3. Ectasia of the main pulmonary artery to 4.0 cm which can be seen  with pulmonary hypertension.  4. Scattered pulmonary nodules, better seen on comparison diagnostic  CT 10/1/2020. The 1.4 cm nodular density in the right lower lobe is  partially obscured on this study by motion artifact. Recommend  continued surveillance with diagnostic CT chest.  5. Partially calcified pleural plaques suggesting history of exposure  to asbestos.    CTA chest at [facility] 10/1/20:      6MWT 3/15/21: Ambulated 283 meters,  desaturated to 83% while on 6 L/min oxymizer      Assessment and Plan:     Giuseppe Linton is a very pleasant 56 year old male with moderate pulmonary hypertension of multifactorial etiology including emphysema, methamphetamine use, obesity, high output state, and mild diastolic dysfunction who returns today for follow-up.       He appears to be volume overloaded and had elevated biventricular pressures with PCWP 21 mmHg on his most recent right heart catheterization.  His NT-proBNP is normal, which may be due to his obesity.  We will increase his lasix from 40 mg daily to 40 mg BID and check a BMP in 1-2 weeks.    The segmental/subsegmental lesions in right A1/A2 seen on pulmonary angigoram correspond to an area of emphysematous bleb, so we will not pursue balloon pulmonary angioplasty.    We are hesitant to initiate pulmonary vasodilator therapy since he appears to have isolated postcapillary hypertension, he is in a high output state, and his most recent PVR was 1.8.  We recommended a low salt 2 g daily diet and 60-64 fluid oz daily limit.  He will complete pulmonary rehab shortly and since he would like to continue, we placed another referral for pulmonary rehab.  We also placed a dietitian consult per his request.  We congratulated him on quitting smoking and advised him to continue to avoid smoking cigarettes and methamphetamine use.     He is currently being managed by pulmonology at [facility] for his emphysema with Dr. Ghada Pop.  He is on Spiriva and albuterol inhaler.      Follow-up with DARRYL Ghosh in 3 months with labs and with Dr. Das in 6 months with labs.      It was a pleasure seeing Giuseppe Linton at the [facility]. Please contact us with any questions or concerns that you may have.      Patient was seen and discussed with staff attending, Dr. Das.      Sincerely,      Giovana Og MD, PhD  Cardiology Fellow    I examined the patient  and agree with the assessment and plan of Dr. Og    Total time today was 45 minutes reviewing notes, imaging, labs, patient visit, orders and documentation         Thiago Das MD   [facility]  Heart Failure, Transplant, and Mechanical Circulatory Support Cardiology   Cardiovascular Division  [facility]   [phone]

## 2021-09-27 NOTE — PATIENT INSTRUCTIONS
Medication Changes:  - INCREASE Lasix to 40 mg twice a day    Patient Instructions:  1. Continue staying active and eat a heart healthy diet.    2. Please keep current list of medications with you at all times.    3. Remember to weigh yourself daily after voiding and before you consume any food or beverages and log the numbers.  If you have gained 2 pounds overnight or 5 pounds in a week contact us immediately for medication adjustments or further instructions.    4. **Please call us immediately if you have any syncope (fainting or passing out), chest pain, edema (swelling or weight gain), or decline in your functional status (general decline in how you are feeling).    5. Patients on Remodulin (treprostinil) or Veletri (epoprostenol): Please make sure that you have your backup pump and supplies with you at all times, your mixing instructions, and contact information for your specialty pharmacy.    Follow up Appointment Information:  - BMP lab follow up in 1-2 weeks to assess your kidney function and electrolytes on new Lasix dose  - We will discuss your case at the next CTEPH meeting to determine if you are a candidate for balloon pulmonary angioplasty  - Nutrition and pulmonary referrals placed  - 3 month follow up with DARRYL Ghosh with labs prior     Results:  Component      Latest Ref Rng & Units 9/27/2021   Sodium      133 - 144 mmol/L 140   Potassium      3.4 - 5.3 mmol/L 4.3   Chloride      94 - 109 mmol/L 105   Carbon Dioxide      20 - 32 mmol/L 29   Anion Gap      3 - 14 mmol/L 6   Urea Nitrogen      7 - 30 mg/dL 15   Creatinine      0.66 - 1.25 mg/dL 1.13   Calcium      8.5 - 10.1 mg/dL 9.4   Glucose      70 - 99 mg/dL 140 (H)   GFR Estimate      >60 mL/min/1.73m2 72   N-Terminal Pro Bnp      0 - 125 pg/mL 10     We are located on the third floor of [facility] on the [facility].  Our address is     [address] [place] [address] on [address] [place] [address]    Thank you for allowing us to be a part of your care here at the [facility]    If you have questions or concerns please contact us at:    Hannah Thompson, RN, BSN, PHN  Genesis Chappell (Scheduling,Prior Auth)  Nurse Coordinator     Clinic   Pulmonary Hypertension   Pulmonary Hypertension  [facility]  (Phone)[phone]   (Phone) [phone]        (Fax) [phone]    ** Please note that you will NOT receive a reminder call regarding your scheduled testing, reminder calls are for provider appointments only.  If you are scheduled for testing within the [employer] system you may receive a call regarding pre-registration for billing purposes only.**     Support Group:  Pulmonary Hypertension Association  Https://www.phassociation.org/  **Look at the Events Tab** They even have Support Groups that you can call into    [facility] Support Group  Second Saturday of the Month from 1-3 PM   Location: [address] [place] [address]  Leader: Adenike Pastrana and Yana Fu  Phone: [phone] or [phone]  Email: [email or web address]

## 2021-09-27 NOTE — NURSING NOTE
Chief Complaint   Patient presents with     Follow Up     Return PH f/u after RHC/PA on 9/10     Vitals were taken and medications reconciled.    David Hamlin, EMT  9:40 AM

## 2021-09-27 NOTE — LETTER
2021      RE: Giuseppe Linton  [address] [address] [address]       Dear Colleague,    Thank you for the opportunity to participate in the care of your patient, Giuseppe Linton, at the [facility] [place] [facility]. Please see a copy of my visit note below.    Service Date: 2021    Ghada Pop MD   [facility]   [address], [address] [address]     RE:  Giuseppe Linton   MRN:  [id number]  :  1965      Dear Dr. Pop:     We had the pleasure of seeing Giuseppe Linton at the [facility].  As you know, Mr. Linton is a very pleasant 56 year old male with moderate pulmonary hypertension that is likely multifactorial in the setting of emphysema, methamphetamine use, obesity, high output and mild diastolic dysfunction.  He returns today for 6 month follow-up.      Since he was last seen in our clinic, he had a right heart catheterization and pulmonary angiogram since his nuclear medicine lung perfusion scan showed small subsegmental wedge-shaped perfusion deficits in the bilateral lower lobes.  He was found to have moderate postcapillary pulmonary hypertension with mean PA pressure 35 mmHg, PCWP 21, preserved cardiac index, and PVR 1.8 GARDNER.  Per our review, pulmonary angiogram showed segmental/subsegmental lesions in right A1/A2 that correspond to an area of emphysematous bleb.    He notes overall feeling stable from a cardiopulmonary perspective.  He has not noticed any worsening in his breathing since he was last seen in in our clinic.  However, he feels that his breathing is worse after he got COVID-19 pneumonia in February compared to prior to his COVID-19 infection.  He stopped smoking 2.5 months ago.  He has been participating in pulmonary rehab.  We would categorize him as NYHA functional class III.  Denies exertional chest pain,  syncope, presyncope, and palpitations.  He has some orthopnea but only needs to sleep on 1 pillow.  He is currently only taking lasix 40 mg daily.     PAST MEDICAL HISTORY:  Past Medical History:   Diagnosis Date     CHF (congestive heart failure) (H)      Cor pulmonale (chronic) (H)      Depressive disorder, not elsewhere classified      H/O respiratory failure      Hyponatremia      Pulmonary hypertension (H)        PAST SURGICAL HISTORY:  Past Surgical History:   Procedure Laterality Date     CV PULMONARY ANGIOGRAM N/A 9/10/2021     CV RIGHT HEART CATH MEASUREMENTS RECORDED N/A 2020     CV RIGHT HEART CATH MEASUREMENTS RECORDED N/A 9/10/2021       SOCIAL HISTORY:  Social History     Socioeconomic History     Marital status: Single     Spouse name: Not on file     Number of children: Not on file     Years of education: Not on file     Highest education level: Not on file   Occupational History     Not on file   Tobacco Use     Smoking status: Current Some Day Smoker     Last attempt to quit: 10/2020     Years since quittin.9     Smokeless tobacco: Current User   Substance and Sexual Activity     Alcohol use: Not on file     Drug use: Not on file     Sexual activity: Not Currently   Other Topics Concern     Parent/sibling w/ CABG, MI or angioplasty before 65F 55M? Not Asked   Social History Narrative     Not on file     Social Determinants of Health     Financial Resource Strain:      Difficulty of Paying Living Expenses:    Food Insecurity:      Worried About Running Out of Food in the Last Year:      Ran Out of Food in the Last Year:    Transportation Needs:      Lack of Transportation (Medical):      Lack of Transportation (Non-Medical):    Physical Activity:      Days of Exercise per Week:      Minutes of Exercise per Session:    Stress:      Feeling of Stress :    Social Connections:      Frequency of Communication with Friends and Family:      Frequency of Social Gatherings with Friends and Family:       Attends Bahai Services:      Active Member of Clubs or Organizations:      Attends Club or Organization Meetings:      Marital Status:    Intimate Partner Violence:      Fear of Current or Ex-Partner:      Emotionally Abused:      Physically Abused:      Sexually Abused:        CURRENT MEDICATIONS:  Current Outpatient Medications   Medication Sig     acetaminophen (TYLENOL) 325 MG tablet Take 325-650 mg by mouth every 6 hours as needed for mild pain     albuterol (PROAIR HFA/PROVENTIL HFA/VENTOLIN HFA) 108 (90 Base) MCG/ACT inhaler as needed     buPROPion (WELLBUTRIN SR) 150 MG 12 hr tablet 150 mg daily     furosemide (LASIX) 40 MG tablet Take 1 tablet (40 mg) by mouth 2 times daily     Multiple Vitamin (MULTI VITAMIN  MENS) TABS Take 1 tablet by mouth daily     SPIRIVA RESPIMAT 2.5 MCG/ACT inhaler daily     spironolactone (ALDACTONE) 25 MG tablet Take 25 mg by mouth daily     SYMBICORT 160-4.5 MCG/ACT Inhaler daily     traZODone (DESYREL) 50 MG tablet daily     No current facility-administered medications for this visit.     Facility-Administered Medications Ordered in Other Visits   Medication     sodium chloride (PF) 0.9% PF flush 10 mL       ROS:   10 point ROS negative except as discussed in above HPI.    EXAM:  /78 (BP Location: Right arm, Patient Position: Chair, Cuff Size: Adult Large)   Pulse 99   Wt 137 kg (302 lb)   SpO2 93%   BMI 40.96 kg/m    General: appears comfortable, alert and articulate  Head: normocephalic, atraumatic  Eyes: anicteric sclera, EOMI  Neck: no adenopathy  Orophyarynx: moist mucosa, no lesions, dentition intact  Heart: regular, normal S1/S2, no murmur, gallop, rub, difficult to appreciate JVP, 2+ radial and carotid pulses  Lungs: clear to auscultation bilaterally, no rales or wheezing  Abdomen: soft, non-tender, bowel sounds present, no hepatosplenomegaly  Extremities: clubbing present, 2+ bilateral lower extremity pitting edema to the proximal tibia  Neurological:  normal speech and affect, no gross motor deficits    Labs:  Recent Results (from the past 48 hour(s))   Basic metabolic panel    Collection Time: 09/27/21  9:09 AM   Result Value Ref Range    Sodium 140 133 - 144 mmol/L    Potassium 4.3 3.4 - 5.3 mmol/L    Chloride 105 94 - 109 mmol/L    Carbon Dioxide (CO2) 29 20 - 32 mmol/L    Anion Gap 6 3 - 14 mmol/L    Urea Nitrogen 15 7 - 30 mg/dL    Creatinine 1.13 0.66 - 1.25 mg/dL    Calcium 9.4 8.5 - 10.1 mg/dL    Glucose 140 (H) 70 - 99 mg/dL    GFR Estimate 72 >60 mL/min/1.73m2   N terminal pro BNP outpatient    Collection Time: 09/27/21  9:09 AM   Result Value Ref Range    N Terminal Pro BNP Outpatient 10 0 - 125 pg/mL         Echocardiogram 3/15/21:  Technically difficult study.Extremely poor acoustic windows.  Left ventricular size is normal.  The Ejection Fraction is estimated at 50-55%.  Mild right ventricular dilation is present.  Global right ventricular function is mildly reduced.  Pulmonary artery systolic pressure cannot be assessed.  PV AT 102ms.  IVC diameter and respiratory changes fall into an intermediate range  suggesting an RA pressure of 8 mmHg.  No pericardial effusion is present.  There is no prior study for direct comparison.    RHC and pulmonary angiogram 9/10/21:  RA: 15  RV: 52/15  PA: 52/27 (35)  PCWP: 21  TD CO/CI: 7.7/3.1  Leonel CO/CI: 8.0/3.2  PVR: 1.8    Per our review, pulmonary angiogram showed segmental/subsegmental lesions in right A1/A2 that correspond to an area of emphysematous bleb.    NM lung perfusion scan 12/30/20:  1. Small subsegmental wedge-shaped perfusion deficits in the lower  lobes bilaterally, technically age-indeterminant. If the D-dimer is  not elevated, this can support the diagnosis of chronic thromboembolic  disease.   2. Mild cardiomegaly.  3. Ectasia of the main pulmonary artery to 4.0 cm which can be seen  with pulmonary hypertension.  4. Scattered pulmonary nodules, better seen on comparison diagnostic  CT  10/1/2020. The 1.4 cm nodular density in the right lower lobe is  partially obscured on this study by motion artifact. Recommend  continued surveillance with diagnostic CT chest.  5. Partially calcified pleural plaques suggesting history of exposure  to asbestos.    CTA chest at [facility] 10/1/20:      6MWT 3/15/21: Ambulated 283 meters, desaturated to 83% while on 6 L/min oxymizer      Assessment and Plan:     Giuseppe Linton is a very pleasant 56 year old male with moderate pulmonary hypertension of multifactorial etiology including emphysema, methamphetamine use, obesity, high output state, and mild diastolic dysfunction who returns today for follow-up.       He appears to be volume overloaded and had elevated biventricular pressures with PCWP 21 mmHg on his most recent right heart catheterization.  His NT-proBNP is normal, which may be due to his obesity.  We will increase his lasix from 40 mg daily to 40 mg BID and check a BMP in 1-2 weeks.    The segmental/subsegmental lesions in right A1/A2 seen on pulmonary angigoram correspond to an area of emphysematous bleb, so we will not pursue balloon pulmonary angioplasty.    We are hesitant to initiate pulmonary vasodilator therapy since he appears to have isolated postcapillary hypertension, he is in a high output state, and his most recent PVR was 1.8.  We recommended a low salt 2 g daily diet and 60-64 fluid oz daily limit.  He will complete pulmonary rehab shortly and since he would like to continue, we placed another referral for pulmonary rehab.  We also placed a dietitian consult per his request.  We congratulated him on quitting smoking and advised him to continue to avoid smoking cigarettes and methamphetamine use.     He is currently being managed by pulmonology at [facility] for his emphysema with Dr. Ghada Pop.  He is on Spiriva and albuterol inhaler.      Follow-up with DARRYL Ghosh in 3 months with labs and with Dr. Das in 6  months with labs.      It was a pleasure seeing Giuseppe Linton at the [facility]. Please contact us with any questions or concerns that you may have.      Patient was seen and discussed with staff attending, Dr. Das.      Sincerely,      Giovana Og MD, PhD  Cardiology Fellow    I examined the patient and agree with the assessment and plan of Dr. Og    Total time today was 45 minutes reviewing notes, imaging, labs, patient visit, orders and documentation         Thiago Das MD   [facility]  Heart Failure, Transplant, and Mechanical Circulatory Support Cardiology   Cardiovascular Division  [facility]   [phone]

## 2021-09-27 NOTE — LETTER
2021      RE: Giuseppe Linton  [address] [place] [address]       Service Date: 2021    Ghada Pop MD   [facility]   [address] [address]     RE:  Giuseppe Linton   MRN:  [id number]  :  1965      Dear Dr. Pop:     We had the pleasure of seeing Giuseppe Linton at the [facility].  As you know, Mr. Linton is a very pleasant 56 year old male with moderate pulmonary hypertension that is likely multifactorial in the setting of emphysema, methamphetamine use, obesity, high output and mild diastolic dysfunction.  He returns today for 6 month follow-up.      Since he was last seen in our clinic, he had a right heart catheterization and pulmonary angiogram since his nuclear medicine lung perfusion scan showed small subsegmental wedge-shaped perfusion deficits in the bilateral lower lobes.  He was found to have moderate postcapillary pulmonary hypertension with mean PA pressure 35 mmHg, PCWP 21, preserved cardiac index, and PVR 1.8 GARDNER.  Per our review, pulmonary angiogram showed segmental/subsegmental lesions in right A1/A2 that correspond to an area of emphysematous bleb.    He notes overall feeling stable from a cardiopulmonary perspective.  He has not noticed any worsening in his breathing since he was last seen in in our clinic.  However, he feels that his breathing is worse after he got COVID-19 pneumonia in February compared to prior to his COVID-19 infection.  He stopped smoking 2.5 months ago.  He has been participating in pulmonary rehab.  We would categorize him as NYHA functional class III.  Denies exertional chest pain, syncope, presyncope, and palpitations.  He has some orthopnea but only needs to sleep on 1 pillow.  He is currently only taking lasix 40 mg daily.     PAST MEDICAL HISTORY:  Past Medical History:   Diagnosis Date     CHF (congestive heart failure) (H)      Cor  pulmonale (chronic) (H)      Depressive disorder, not elsewhere classified      H/O respiratory failure      Hyponatremia      Pulmonary hypertension (H)        PAST SURGICAL HISTORY:  Past Surgical History:   Procedure Laterality Date     CV PULMONARY ANGIOGRAM N/A 9/10/2021     CV RIGHT HEART CATH MEASUREMENTS RECORDED N/A 2020     CV RIGHT HEART CATH MEASUREMENTS RECORDED N/A 9/10/2021       SOCIAL HISTORY:  Social History     Socioeconomic History     Marital status: Single     Spouse name: Not on file     Number of children: Not on file     Years of education: Not on file     Highest education level: Not on file   Occupational History     Not on file   Tobacco Use     Smoking status: Current Some Day Smoker     Last attempt to quit: 10/2020     Years since quittin.9     Smokeless tobacco: Current User   Substance and Sexual Activity     Alcohol use: Not on file     Drug use: Not on file     Sexual activity: Not Currently   Other Topics Concern     Parent/sibling w/ CABG, MI or angioplasty before 65F 55M? Not Asked   Social History Narrative     Not on file     Social Determinants of Health     Financial Resource Strain:      Difficulty of Paying Living Expenses:    Food Insecurity:      Worried About Running Out of Food in the Last Year:      Ran Out of Food in the Last Year:    Transportation Needs:      Lack of Transportation (Medical):      Lack of Transportation (Non-Medical):    Physical Activity:      Days of Exercise per Week:      Minutes of Exercise per Session:    Stress:      Feeling of Stress :    Social Connections:      Frequency of Communication with Friends and Family:      Frequency of Social Gatherings with Friends and Family:      Attends Taoism Services:      Active Member of Clubs or Organizations:      Attends Club or Organization Meetings:      Marital Status:    Intimate Partner Violence:      Fear of Current or Ex-Partner:      Emotionally Abused:      Physically Abused:       Sexually Abused:        CURRENT MEDICATIONS:  Current Outpatient Medications   Medication Sig     acetaminophen (TYLENOL) 325 MG tablet Take 325-650 mg by mouth every 6 hours as needed for mild pain     albuterol (PROAIR HFA/PROVENTIL HFA/VENTOLIN HFA) 108 (90 Base) MCG/ACT inhaler as needed     buPROPion (WELLBUTRIN SR) 150 MG 12 hr tablet 150 mg daily     furosemide (LASIX) 40 MG tablet Take 1 tablet (40 mg) by mouth 2 times daily     Multiple Vitamin (MULTI VITAMIN  MENS) TABS Take 1 tablet by mouth daily     SPIRIVA RESPIMAT 2.5 MCG/ACT inhaler daily     spironolactone (ALDACTONE) 25 MG tablet Take 25 mg by mouth daily     SYMBICORT 160-4.5 MCG/ACT Inhaler daily     traZODone (DESYREL) 50 MG tablet daily     No current facility-administered medications for this visit.     Facility-Administered Medications Ordered in Other Visits   Medication     sodium chloride (PF) 0.9% PF flush 10 mL       ROS:   10 point ROS negative except as discussed in above HPI.    EXAM:  /78 (BP Location: Right arm, Patient Position: Chair, Cuff Size: Adult Large)   Pulse 99   Wt 137 kg (302 lb)   SpO2 93%   BMI 40.96 kg/m    General: appears comfortable, alert and articulate  Head: normocephalic, atraumatic  Eyes: anicteric sclera, EOMI  Neck: no adenopathy  Orophyarynx: moist mucosa, no lesions, dentition intact  Heart: regular, normal S1/S2, no murmur, gallop, rub, difficult to appreciate JVP, 2+ radial and carotid pulses  Lungs: clear to auscultation bilaterally, no rales or wheezing  Abdomen: soft, non-tender, bowel sounds present, no hepatosplenomegaly  Extremities: clubbing present, 2+ bilateral lower extremity pitting edema to the proximal tibia  Neurological: normal speech and affect, no gross motor deficits    Labs:  Recent Results (from the past 48 hour(s))   Basic metabolic panel    Collection Time: 09/27/21  9:09 AM   Result Value Ref Range    Sodium 140 133 - 144 mmol/L    Potassium 4.3 3.4 - 5.3 mmol/L     Chloride 105 94 - 109 mmol/L    Carbon Dioxide (CO2) 29 20 - 32 mmol/L    Anion Gap 6 3 - 14 mmol/L    Urea Nitrogen 15 7 - 30 mg/dL    Creatinine 1.13 0.66 - 1.25 mg/dL    Calcium 9.4 8.5 - 10.1 mg/dL    Glucose 140 (H) 70 - 99 mg/dL    GFR Estimate 72 >60 mL/min/1.73m2   N terminal pro BNP outpatient    Collection Time: 09/27/21  9:09 AM   Result Value Ref Range    N Terminal Pro BNP Outpatient 10 0 - 125 pg/mL         Echocardiogram 3/15/21:  Technically difficult study.Extremely poor acoustic windows.  Left ventricular size is normal.  The Ejection Fraction is estimated at 50-55%.  Mild right ventricular dilation is present.  Global right ventricular function is mildly reduced.  Pulmonary artery systolic pressure cannot be assessed.  PV AT 102ms.  IVC diameter and respiratory changes fall into an intermediate range  suggesting an RA pressure of 8 mmHg.  No pericardial effusion is present.  There is no prior study for direct comparison.    RHC and pulmonary angiogram 9/10/21:  RA: 15  RV: 52/15  PA: 52/27 (35)  PCWP: 21  TD CO/CI: 7.7/3.1  Leonel CO/CI: 8.0/3.2  PVR: 1.8    Per our review, pulmonary angiogram showed segmental/subsegmental lesions in right A1/A2 that correspond to an area of emphysematous bleb.    NM lung perfusion scan 12/30/20:  1. Small subsegmental wedge-shaped perfusion deficits in the lower  lobes bilaterally, technically age-indeterminant. If the D-dimer is  not elevated, this can support the diagnosis of chronic thromboembolic  disease.   2. Mild cardiomegaly.  3. Ectasia of the main pulmonary artery to 4.0 cm which can be seen  with pulmonary hypertension.  4. Scattered pulmonary nodules, better seen on comparison diagnostic  CT 10/1/2020. The 1.4 cm nodular density in the right lower lobe is  partially obscured on this study by motion artifact. Recommend  continued surveillance with diagnostic CT chest.  5. Partially calcified pleural plaques suggesting history of exposure  to  asbestos.    [facility]A chest at [facility] 10/1/20:      6MWT 3/15/21: Ambulated 283 meters, desaturated to 83% while on 6 L/min oxymizer      Assessment and Plan:     Giuseppe Linton is a very pleasant 56 year old male with moderate pulmonary hypertension of multifactorial etiology including emphysema, methamphetamine use, obesity, high output state, and mild diastolic dysfunction who returns today for follow-up.       He appears to be volume overloaded and had elevated biventricular pressures with PCWP 21 mmHg on his most recent right heart catheterization.  His NT-proBNP is normal, which may be due to his obesity.  We will increase his lasix from 40 mg daily to 40 mg BID and check a BMP in 1-2 weeks.    The segmental/subsegmental lesions in right A1/A2 seen on pulmonary angigoram correspond to an area of emphysematous bleb, so we will not pursue balloon pulmonary angioplasty.    We are hesitant to initiate pulmonary vasodilator therapy since he appears to have isolated postcapillary hypertension, he is in a high output state, and his most recent PVR was 1.8.  We recommended a low salt 2 g daily diet and 60-64 fluid oz daily limit.  He will complete pulmonary rehab shortly and since he would like to continue, we placed another referral for pulmonary rehab.  We also placed a dietitian consult per his request.  We congratulated him on quitting smoking and advised him to continue to avoid smoking cigarettes and methamphetamine use.     He is currently being managed by pulmonology at [facility] for his emphysema with Dr. Ghada Pop.  He is on Spiriva and albuterol inhaler.      Follow-up with DARRYL Ghosh in 3 months with labs and with Dr. Das in 6 months with labs.      It was a pleasure seeing Giuseppe Linton at the [facility]. Please contact us with any questions or concerns that you may have.      Patient was seen and discussed with staff attending,   Thiago.      Sincerely,      Giovana Og MD, PhD  Cardiology Fellow    I examined the patient and agree with the assessment and plan of Dr. Og    Total time today was 45 minutes reviewing notes, imaging, labs, patient visit, orders and documentation         Thiago Das MD   [facility]  Heart Failure, Transplant, and Mechanical Circulatory Support Cardiology   Cardiovascular Division  [facility]   [phone]        Thiago Das MD

## 2021-09-27 NOTE — NURSING NOTE
Medication Change: Patient was educated regarding prescribed medication change, including discussion of the indication, administration, side effects, and when to report to MD or RN. Patient demonstrated understanding of this information and agreed to call with further questions or concerns.  Labs: Patient was given results of the laboratory testing obtained today. Patient demonstrated understanding of this information and agreed to call with further questions or concerns.   Patient was instructed to return for the next laboratory testing 1-2 weeks.   Diet: Patient instructed regarding a heart healthy diet, including discussion of reduced fat and sodium intake. Patient demonstrated understanding of this information and agreed to call with further questions or concerns.  Return Appointment: Patient given instructions regarding scheduling next clinic visit. Patient demonstrated understanding of this information and agreed to call with further questions or concerns.  Patient stated he understood all health information given and agreed to call with further questions or concerns.    Orders placed and patient escorted to Pods to schedule F/U appt. Queenie Harley RN on 9/27/2021 at 10:16 AM    Pulmonary rehab referral faxed to [facility] at (f) [phone]. Staff message sent to [facility] to follow up on labs. Queenie Harley RN on 9/27/2021 at 10:20 AM

## 2021-10-08 ENCOUNTER — TELEPHONE (OUTPATIENT)
Dept: CARDIOLOGY | Facility: CLINIC | Age: 56
End: 2021-10-08

## 2021-10-08 NOTE — TELEPHONE ENCOUNTER
Health Call Center    Phone Message    May a detailed message be left on voicemail: yes     Reason for Call: Order(s): Other:   Reason for requested: Fax nutrition referral to [facility], [phone]  Date needed: 10/08/21  Provider name: Dr. Das      Action Taken: Message routed to:  [facility] Cardiology    Travel Screening: Not Applicable           -----------------------------  Faxed referral to above number. Hannah Thompson, RN on 10/14/2021 at 2:33 PM

## 2021-10-11 ENCOUNTER — MYC MEDICAL ADVICE (OUTPATIENT)
Dept: CARDIOLOGY | Facility: CLINIC | Age: 56
End: 2021-10-11

## 2021-10-13 ENCOUNTER — LAB (OUTPATIENT)
Dept: LAB | Facility: CLINIC | Age: 56
End: 2021-10-13
Payer: COMMERCIAL

## 2021-10-13 DIAGNOSIS — R06.02 SOB (SHORTNESS OF BREATH): ICD-10-CM

## 2021-10-13 DIAGNOSIS — I27.20 PULMONARY HYPERTENSION (H): ICD-10-CM

## 2021-10-13 LAB
ANION GAP SERPL CALCULATED.3IONS-SCNC: 2 MMOL/L (ref 3–14)
BUN SERPL-MCNC: 12 MG/DL (ref 7–30)
CALCIUM SERPL-MCNC: 9.5 MG/DL (ref 8.5–10.1)
CHLORIDE BLD-SCNC: 100 MMOL/L (ref 94–109)
CO2 SERPL-SCNC: 33 MMOL/L (ref 20–32)
CREAT SERPL-MCNC: 1.1 MG/DL (ref 0.66–1.25)
GFR SERPL CREATININE-BSD FRML MDRD: 74 ML/MIN/1.73M2
GLUCOSE BLD-MCNC: 129 MG/DL (ref 70–99)
POTASSIUM BLD-SCNC: 4.4 MMOL/L (ref 3.4–5.3)
SODIUM SERPL-SCNC: 135 MMOL/L (ref 133–144)

## 2021-10-13 PROCEDURE — 36415 COLL VENOUS BLD VENIPUNCTURE: CPT

## 2021-10-13 PROCEDURE — 80048 BASIC METABOLIC PNL TOTAL CA: CPT

## 2021-10-15 ENCOUNTER — TELEPHONE (OUTPATIENT)
Dept: CARDIOLOGY | Facility: CLINIC | Age: 56
End: 2021-10-15

## 2021-10-15 NOTE — TELEPHONE ENCOUNTER
Labs look great, sent patient a [facility] message.  Component      Latest Ref Rng & Units 10/13/2021   Sodium      133 - 144 mmol/L 135   Potassium      3.4 - 5.3 mmol/L 4.4   Chloride      94 - 109 mmol/L 100   Carbon Dioxide      20 - 32 mmol/L 33 (H)   Anion Gap      3 - 14 mmol/L 2 (L)   Urea Nitrogen      7 - 30 mg/dL 12   Creatinine      0.66 - 1.25 mg/dL 1.10   Calcium      8.5 - 10.1 mg/dL 9.5   Glucose      70 - 99 mg/dL 129 (H)   GFR Estimate      >60 mL/min/1.73m2 74   Hannah Thompson RN on 10/15/2021 at 1:40 PM      ----- Message from Hannah Thompson RN sent at 9/28/2021 10:43 AM CDT -----  Regarding: Labs  Patient scheduled to have labs today after increasing Lasix 2 weeks ago.  Look for results.  ----- Message -----  From: Queenie Harley RN  Sent: 9/27/2021  10:21 AM CDT  To: Hannah Thompson RN  Subject: Lasix Increase                                   Hi Dr. Thiago Young increased patient's Lasix to 40 mg BID. He will need a lab F/U in 1-2 weeks; I escorted the patient to the Pods to help get scheduled. Can you please follow up on the results?    Thank you!Oleg

## 2022-03-08 ENCOUNTER — LAB (OUTPATIENT)
Dept: LAB | Facility: CLINIC | Age: 57
End: 2022-03-08
Attending: PHYSICIAN ASSISTANT
Payer: COMMERCIAL

## 2022-03-08 ENCOUNTER — OFFICE VISIT (OUTPATIENT)
Dept: CARDIOLOGY | Facility: CLINIC | Age: 57
End: 2022-03-08
Attending: PHYSICIAN ASSISTANT
Payer: COMMERCIAL

## 2022-03-08 VITALS
SYSTOLIC BLOOD PRESSURE: 127 MMHG | BODY MASS INDEX: 40.96 KG/M2 | DIASTOLIC BLOOD PRESSURE: 80 MMHG | WEIGHT: 302 LBS | HEART RATE: 82 BPM | OXYGEN SATURATION: 98 %

## 2022-03-08 DIAGNOSIS — R06.02 SOB (SHORTNESS OF BREATH): ICD-10-CM

## 2022-03-08 DIAGNOSIS — I27.20 PULMONARY HYPERTENSION (H): ICD-10-CM

## 2022-03-08 LAB
ANION GAP SERPL CALCULATED.3IONS-SCNC: 7 MMOL/L (ref 3–14)
BUN SERPL-MCNC: 15 MG/DL (ref 7–30)
CALCIUM SERPL-MCNC: 9 MG/DL (ref 8.5–10.1)
CHLORIDE BLD-SCNC: 104 MMOL/L (ref 94–109)
CO2 SERPL-SCNC: 29 MMOL/L (ref 20–32)
CREAT SERPL-MCNC: 1.11 MG/DL (ref 0.66–1.25)
ERYTHROCYTE [DISTWIDTH] IN BLOOD BY AUTOMATED COUNT: 15.7 % (ref 10–15)
GFR SERPL CREATININE-BSD FRML MDRD: 77 ML/MIN/1.73M2
GLUCOSE BLD-MCNC: 115 MG/DL (ref 70–99)
HCT VFR BLD AUTO: 39.9 % (ref 40–53)
HGB BLD-MCNC: 12.4 G/DL (ref 13.3–17.7)
MCH RBC QN AUTO: 30.4 PG (ref 26.5–33)
MCHC RBC AUTO-ENTMCNC: 31.1 G/DL (ref 31.5–36.5)
MCV RBC AUTO: 98 FL (ref 78–100)
NT-PROBNP SERPL-MCNC: 29 PG/ML (ref 0–125)
PLATELET # BLD AUTO: 247 10E3/UL (ref 150–450)
POTASSIUM BLD-SCNC: 4.3 MMOL/L (ref 3.4–5.3)
RBC # BLD AUTO: 4.08 10E6/UL (ref 4.4–5.9)
SODIUM SERPL-SCNC: 140 MMOL/L (ref 133–144)
WBC # BLD AUTO: 6.5 10E3/UL (ref 4–11)

## 2022-03-08 PROCEDURE — 85027 COMPLETE CBC AUTOMATED: CPT | Performed by: PATHOLOGY

## 2022-03-08 PROCEDURE — 83880 ASSAY OF NATRIURETIC PEPTIDE: CPT | Performed by: PATHOLOGY

## 2022-03-08 PROCEDURE — 80048 BASIC METABOLIC PNL TOTAL CA: CPT | Performed by: PATHOLOGY

## 2022-03-08 PROCEDURE — 99214 OFFICE O/P EST MOD 30 MIN: CPT | Performed by: PHYSICIAN ASSISTANT

## 2022-03-08 PROCEDURE — 36415 COLL VENOUS BLD VENIPUNCTURE: CPT | Performed by: PATHOLOGY

## 2022-03-08 RX ORDER — ATORVASTATIN CALCIUM 40 MG/1
40 TABLET, FILM COATED ORAL DAILY
COMMUNITY
Start: 2022-01-04

## 2022-03-08 RX ORDER — PRAMIPEXOLE DIHYDROCHLORIDE 0.25 MG/1
TABLET ORAL
COMMUNITY
Start: 2022-02-17 | End: 2023-10-04

## 2022-03-08 RX ORDER — NALTREXONE HYDROCHLORIDE 50 MG/1
50 TABLET, FILM COATED ORAL DAILY
COMMUNITY
Start: 2022-02-08

## 2022-03-08 RX ORDER — BUPROPION HYDROCHLORIDE 150 MG/1
150 TABLET, FILM COATED, EXTENDED RELEASE ORAL
COMMUNITY
End: 2022-08-29

## 2022-03-08 RX ORDER — ASPIRIN 81 MG/1
TABLET ORAL
COMMUNITY
Start: 2022-01-01

## 2022-03-08 RX ORDER — FUROSEMIDE 40 MG
80 TABLET ORAL DAILY
Qty: 180 TABLET | Refills: 3
Start: 2022-03-08 | End: 2022-08-29

## 2022-03-08 ASSESSMENT — PAIN SCALES - GENERAL: PAINLEVEL: NO PAIN (0)

## 2022-03-08 NOTE — PATIENT INSTRUCTIONS
Thank you for visiting the Pulmonary Hypertension Clinic today.      Today we discussed:   Try taking BOTH the Lasix pills (for a total of 80mg) once daily in the morning. If you have any trouble with this please call so we adjust.  Try to keep weighing at home, and call with an upward trend.   Keep watching sodium in your diet. Ideally we would like to see you <2000mg per day.   Keep working on stopping smoking again.       Follow up Appointment Information:  We will recheck some labs near your home in ~2 weeks.    Return in ~4 months to see Dr Das in clinic.       Additional Instructions:    1. Continue staying active and eat a heart healthy, low sodium diet.     2. Please keep current list of medications with you at all times.     3. Remember to weigh yourself daily after voiding and write it down on a log. If you have gained/lost 2 pounds overnight or 5 pounds in a week contact us for medication adjustments or further instructions.    4. Please call us immediately if you have syncope (fainting or passing out), chest pain, worsening edema (swelling or weight gain), or general worsening in how you are feeling.         ---------------------------------------------------------------------------------------------------------------    If you have questions or concerns please contact us at:        Hannah Thompson, RN, BSN, PHN  Genesis Chappell  (Schedule,Prior Auth)  Nurse Coordinator     Clinic   Pulmonary Hypertension   Pulmonary Hypertension  [facility]             [facility]  [phone])[phone]    (P) [phone]        (F) [phone]      ** Please note that you will NOT receive a reminder call regarding your scheduled testing, reminder calls are for provider appointments only.  If you are scheduled for testing within the [employer] system you may receive a call regarding pre-registration for billing purposes only.**      ------------------------------------------------------------------------------------------------------------

## 2022-03-08 NOTE — NURSING NOTE
Chief Complaint   Patient presents with     Follow Up     6 month follow up w/labs prior.   Vitals were taken and medications reconciled.    Reid Marinelli, EMT  10:40 AM

## 2022-03-08 NOTE — LETTER
"3/8/2022      RE: Giuseppe Linton  [address] [place] [address]       Dear Colleague,    Thank you for the opportunity to participate in the care of your patient, Giuseppe Linton, at the [facility] [place] at [facility]. Please see a copy of my visit note below.        Date of Visit:  03/08/22      [facility]      Primary PH cardiologist: Dr. Das       HPI:  Mr. Giuesppe Linton is a pleasant 56 year old male with a past medical history significant for emphysema, methamphetamine use, high output state, and diastolic dysfunction. He also has secondary pulmonary hypertension, though is not maintained on any PAH specific therapies.     He was seen last in clinic by Dr. Das in Sept 2021. Just prior to that he had a repeat heart cath and pulmonary angiogram that showed moderate postcapillary pulmonary hypertension with mean PA pressure 35 mmHg, PCWP 21, preserved cardiac index, and PVR 1.8 GARDNER. Per our review, pulmonary angiogram showed segmental/subsegmental lesions in right A1/A2 that correspond to an area of emphysematous bleb. He was doing ok from a symptomatic standpoint, though since having COVID last year felt his breathing was overall worse. He was felt to be somewhat volume up, and Lasix was increased to 40mg BID. Sodium restriction and weight management were recommended.     Today, I'm meeting Giuseppe in clinic for routine follow up. He tells me that lately his breathing has been \"horseshit.\" He tells me that he got bronchitis about a month ago and has now been on a few different courses of prednisone and antibiotics at the direction of his pulmonologist. He still has a lingering cough and more BRANHAM than usual. He also admits he has started back smoking again, usually between 2-5 cigarettes daily. He says that his swelling in his legs comes and goes, but admits he isn't always taking the second " dose of Lasix as he forgets it often. He also struggles with reducing sodium in his diet. He denies any new CP, palpitations, dizziness, or presyncope.     Labs were performed prior to our visit today and were reviewed: Na 140, K 4.3, BUN 15, SCR 1.11, NT-proBNP 29. WBC 6.5, hemoglobin 12.4, hematocrit 39.9, plt 247.     CURRENT PULMONARY HYPERTENSION REGIMEN:    PAH Rx: None    Diuretics: Lasix 40mg BID (though taking mostly as 40mg daily), spironolactone 25mg daily     Oxygen: 6LNC     Anticoagulation: None    Pulm:  Dr. Ghada Pop      ASSESSMENT/PLAN:      1. Pulmonary hypertension:   --Mr. Linton has moderate pulmonary hypertension felt to be multifactorial in the setting of emphysema, methamphetamine use, obesity, high output state, and mild diastolic dysfunction. Based on hemodynamic testing last fall, his PH was mostly postcapillary with elevated biventricular filling pressures, and he is not on any PAH directed therapies.    --On exam, he does have some mild swelling and weight is still up, though somewhat suprisingly, his NT-proBNP is normal. He has trouble remembering his second dose of Lasix, so will instead change him to 80mg once in the AM. Recheck BMP in ~2 weeks after he is established routinely on the higher dose. We also discussed the need to continue to work on lowering sodium in his diet which he struggles with.   --He is still recovering from a recent bronchitis and this is evident on exam again today, with some ongoing wheezing/rales. His inhalers were recently adjusted as well an I encouraged him to continue to follow closely with his pulmonologist.   --Continue supplemental oxygen as is, and he is participating in pulmonary rehab.    --Smoking cessation was again advised, as he admits he recently restarted. He denies relapse in regard to methamphetamine use.        Follow up plan: Recheck labs in 2 weeks near his home. Return to see Dr. Das in 4 months with repeat labs.  We are happy  to see the patient back sooner with any new concerns.       Orders this Visit:  Orders Placed This Encounter   Procedures     Basic metabolic panel     Comprehensive Metabolic Panel     N terminal pro BNP outpatient     CBC with platelets     Follow-Up with Cardiology     Orders Placed This Encounter   Medications     atorvastatin (LIPITOR) 40 MG tablet     Sig: Take 40 mg by mouth daily     aspirin 81 MG EC tablet     buPROPion (ZYBAN) 150 MG 12 hr tablet     Sig: Take 150 mg by mouth     TRELEGY ELLIPTA 200-62.5-25 MCG/INH oral inhaler     Sig: INHALE 1 PUFF BY MOUTH EVERY DAY     naltrexone (DEPADE/REVIA) 50 MG tablet     Sig: TAKE 1/2 TABLET BY MOUTH ONCE DAILY     pramipexole (MIRAPEX) 0.25 MG tablet     Sig: TAKE 1 OR 2 TABLETS BY MOUTH ONCE DAILY FOR 30 DAYS     furosemide (LASIX) 40 MG tablet     Sig: Take 2 tablets (80 mg) by mouth daily     Dispense:  180 tablet     Refill:  3     Medications Discontinued During This Encounter   Medication Reason     furosemide (LASIX) 40 MG tablet          CURRENT MEDICATIONS:  Current Outpatient Medications   Medication Sig Dispense Refill     acetaminophen (TYLENOL) 325 MG tablet Take 325-650 mg by mouth every 6 hours as needed for mild pain       albuterol (PROAIR HFA/PROVENTIL HFA/VENTOLIN HFA) 108 (90 Base) MCG/ACT inhaler as needed       aspirin 81 MG EC tablet        atorvastatin (LIPITOR) 40 MG tablet Take 40 mg by mouth daily       buPROPion (WELLBUTRIN SR) 150 MG 12 hr tablet 150 mg daily       buPROPion (ZYBAN) 150 MG 12 hr tablet Take 150 mg by mouth       furosemide (LASIX) 40 MG tablet Take 2 tablets (80 mg) by mouth daily 180 tablet 3     Multiple Vitamin (MULTI VITAMIN  MENS) TABS Take 1 tablet by mouth daily       naltrexone (DEPADE/REVIA) 50 MG tablet TAKE 1/2 TABLET BY MOUTH ONCE DAILY       pramipexole (MIRAPEX) 0.25 MG tablet TAKE 1 OR 2 TABLETS BY MOUTH ONCE DAILY FOR 30 DAYS       traZODone (DESYREL) 50 MG tablet daily       TRELEGY ELLIPTA  200-62.5-25 MCG/INH oral inhaler INHALE 1 PUFF BY MOUTH EVERY DAY       SPIRIVA RESPIMAT 2.5 MCG/ACT inhaler daily       spironolactone (ALDACTONE) 25 MG tablet Take 25 mg by mouth daily       SYMBICORT 160-4.5 MCG/ACT Inhaler daily         ALLERGIES   No Known Allergies    PAST MEDICAL HISTORY:  Past Medical History:   Diagnosis Date     CHF (congestive heart failure) (H)      Cor pulmonale (chronic) (H)      Depressive disorder, not elsewhere classified      H/O respiratory failure      Hyponatremia      Pulmonary hypertension (H)          Review of Systems:  Cardiovascular: negative for chest pain, palpitations, orthopnea, pos LE edema  Constitutional: negative for chills, sweats, fevers   Resp: Negative for dyspnea at rest, pos dyspnea on exertion, cough, known chronic lung disease  HEENT: Negative for new visual changes, frequent headaches  Gastrointestinal: negative for abdominal pain, diarrhea, nausea, vomiting  Hematologic/lymphatic: negative for current systemic anticoagulation, hx of blood clots  Neurological: negative for focal weakness, LOC, seizures, syncope/presyncope       Physical Exam:  Vitals: /80 (BP Location: Right arm, Patient Position: Sitting, Cuff Size: Adult Large)   Pulse 82   Wt 137 kg (302 lb)   SpO2 98%   BMI 40.96 kg/m     Wt Readings from Last 4 Encounters:   03/08/22 137 kg (302 lb)   09/27/21 137 kg (302 lb)   03/15/21 132 kg (291 lb)   12/30/20 133.8 kg (295 lb)       GEN:  In general, this is a well nourished  male in no acute distress on NC.  Patient ambulatory, unaccompanied.   HEENT:  Pupils grossly equal, sclerae nonicteric.   NECK: Supple, trachea midline. Difficult to assess JVD due to bear.   C/V:  Regular rate and rhythm, no murmur, rub or gallop. No S3 or RV heave.   RESP: Respirations are unlabored. No use of accessory muscles. He has scattered posterior faint end expiratory wheezes and occasional rales in the bases L>R.   EXTREM: 1+ bilateral pitting  PT edema. No cyanosis or clubbing.  NEURO: Alert and oriented, cooperative. Gait not formally assessed. No obvious focal deficits.   SKIN: Warm and dry.       Recent Lab Results:  LIVER ENZYME RESULTS:  Lab Results   Component Value Date    AST 24 12/30/2020    ALT 30 12/30/2020       CBC RESULTS:  Lab Results   Component Value Date    WBC 6.5 03/08/2022    WBC 7.4 03/15/2021    RBC 4.08 (L) 03/08/2022    RBC 4.07 (L) 03/15/2021    HGB 12.4 (L) 03/08/2022    HGB 13.0 (L) 03/15/2021    HCT 39.9 (L) 03/08/2022    HCT 41.7 03/15/2021    MCV 98 03/08/2022     (H) 03/15/2021    MCH 30.4 03/08/2022    MCH 31.9 03/15/2021    MCHC 31.1 (L) 03/08/2022    MCHC 31.2 (L) 03/15/2021    RDW 15.7 (H) 03/08/2022    RDW 14.6 03/15/2021     03/08/2022     03/15/2021       BMP RESULTS:  Lab Results   Component Value Date     03/08/2022     03/15/2021    POTASSIUM 4.3 03/08/2022    POTASSIUM 4.4 03/15/2021    CHLORIDE 104 03/08/2022    CHLORIDE 104 03/15/2021    CO2 29 03/08/2022    CO2 30 03/15/2021    ANIONGAP 7 03/08/2022    ANIONGAP 5 03/15/2021     (H) 03/08/2022     (H) 03/15/2021    BUN 15 03/08/2022    BUN 15 03/15/2021    CR 1.11 03/08/2022    CR 1.11 03/15/2021    GFRESTIMATED 77 03/08/2022    GFRESTIMATED 73 03/15/2021    GFRESTBLACK 85 03/15/2021    VISHAL 9.0 03/08/2022    VISHAL 9.1 03/15/2021        Recent Labs   Lab Test 03/08/22  0927 09/27/21  0909 09/10/21  1111 03/15/21  1229   NTBNPI  --   --  10  --    NTBNP 29 10  --  81         Most recent testing:      Echocardiogram  3/15/21     Interpretation Summary  Technically difficult study.Extremely poor acoustic windows.  Left ventricular size is normal.  The Ejection Fraction is estimated at 50-55%.  Mild right ventricular dilation is present.  Global right ventricular function is mildly reduced.  Pulmonary artery systolic pressure cannot be assessed.  PV AT 102ms.  IVC diameter and respiratory changes fall into an intermediate  range  suggesting an RA pressure of 8 mmHg.  No pericardial effusion is present.  There is no prior study for direct comparison.    [facility]  9/10/2021  Conclusion         Right sided filling pressures are mildly elevated.    Mild elevated pulmonary hypertension.    Left sided filling pressures are mildly elevated.    Left ventricular filling pressures are mildly elevated.    Normal cardiac output level.     1.  Lesion in the RA 2 segment was noted with distal pruning of the vessel.  2.  Lesion in the LA 1 & LA 2 segments were noted.  3.  Rest of the segments appear disease-free.      NYHA Functional Class:  3    A total of 30 minutes was spent today performing chart and history review, gathering HPI, physical exam, pre and post visit documentation, and care coordination.        Please do not hesitate to contact me if you have any questions/concerns.     Sincerely,     DARRYL Ghosh

## 2022-03-08 NOTE — PROGRESS NOTES
"    Date of Visit:  03/08/22      [facility] Pulmonary Hypertension Clinic      Primary PH cardiologist: Dr. Das       HPI:  Mr. Giuseppe Linton is a pleasant 56 year old male with a past medical history significant for emphysema, methamphetamine use, high output state, and diastolic dysfunction. He also has secondary pulmonary hypertension, though is not maintained on any PAH specific therapies.     He was seen last in clinic by Dr. Das in Sept 2021. Just prior to that he had a repeat heart cath and pulmonary angiogram that showed moderate postcapillary pulmonary hypertension with mean PA pressure 35 mmHg, PCWP 21, preserved cardiac index, and PVR 1.8 GARDNER. Per our review, pulmonary angiogram showed segmental/subsegmental lesions in right A1/A2 that correspond to an area of emphysematous bleb. He was doing ok from a symptomatic standpoint, though since having COVID last year felt his breathing was overall worse. He was felt to be somewhat volume up, and Lasix was increased to 40mg BID. Sodium restriction and weight management were recommended.     Today, I'm meeting Giuseppe in clinic for routine follow up. He tells me that lately his breathing has been \"horseshit.\" He tells me that he got bronchitis about a month ago and has now been on a few different courses of prednisone and antibiotics at the direction of his pulmonologist. He still has a lingering cough and more BRANHAM than usual. He also admits he has started back smoking again, usually between 2-5 cigarettes daily. He says that his swelling in his legs comes and goes, but admits he isn't always taking the second dose of Lasix as he forgets it often. He also struggles with reducing sodium in his diet. He denies any new CP, palpitations, dizziness, or presyncope.     Labs were performed prior to our visit today and were reviewed: Na 140, K 4.3, BUN 15, SCR 1.11, NT-proBNP 29. WBC 6.5, hemoglobin 12.4, hematocrit 39.9, plt 247.     CURRENT " PULMONARY HYPERTENSION REGIMEN:    PAH Rx: None    Diuretics: Lasix 40mg BID (though taking mostly as 40mg daily), spironolactone 25mg daily     Oxygen: 6LNC     Anticoagulation: None    Pulm:  Dr. Ghada Pop      ASSESSMENT/PLAN:      1. Pulmonary hypertension:   --Mr. Linton has moderate pulmonary hypertension felt to be multifactorial in the setting of emphysema, methamphetamine use, obesity, high output state, and mild diastolic dysfunction. Based on hemodynamic testing last fall, his PH was mostly postcapillary with elevated biventricular filling pressures, and he is not on any PAH directed therapies.    --On exam, he does have some mild swelling and weight is still up, though somewhat suprisingly, his NT-proBNP is normal. He has trouble remembering his second dose of Lasix, so will instead change him to 80mg once in the AM. Recheck BMP in ~2 weeks after he is established routinely on the higher dose. We also discussed the need to continue to work on lowering sodium in his diet which he struggles with.   --He is still recovering from a recent bronchitis and this is evident on exam again today, with some ongoing wheezing/rales. His inhalers were recently adjusted as well an I encouraged him to continue to follow closely with his pulmonologist.   --Continue supplemental oxygen as is, and he is participating in pulmonary rehab.    --Smoking cessation was again advised, as he admits he recently restarted. He denies relapse in regard to methamphetamine use.        Follow up plan: Recheck labs in 2 weeks near his home. Return to see Dr. Das in 4 months with repeat labs.  We are happy to see the patient back sooner with any new concerns.       Orders this Visit:  Orders Placed This Encounter   Procedures     Basic metabolic panel     Comprehensive Metabolic Panel     N terminal pro BNP outpatient     CBC with platelets     Follow-Up with Cardiology     Orders Placed This Encounter   Medications      atorvastatin (LIPITOR) 40 MG tablet     Sig: Take 40 mg by mouth daily     aspirin 81 MG EC tablet     buPROPion (ZYBAN) 150 MG 12 hr tablet     Sig: Take 150 mg by mouth     TRELEGY ELLIPTA 200-62.5-25 MCG/INH oral inhaler     Sig: INHALE 1 PUFF BY MOUTH EVERY DAY     naltrexone (DEPADE/REVIA) 50 MG tablet     Sig: TAKE 1/2 TABLET BY MOUTH ONCE DAILY     pramipexole (MIRAPEX) 0.25 MG tablet     Sig: TAKE 1 OR 2 TABLETS BY MOUTH ONCE DAILY FOR 30 DAYS     furosemide (LASIX) 40 MG tablet     Sig: Take 2 tablets (80 mg) by mouth daily     Dispense:  180 tablet     Refill:  3     Medications Discontinued During This Encounter   Medication Reason     furosemide (LASIX) 40 MG tablet          CURRENT MEDICATIONS:  Current Outpatient Medications   Medication Sig Dispense Refill     acetaminophen (TYLENOL) 325 MG tablet Take 325-650 mg by mouth every 6 hours as needed for mild pain       albuterol (PROAIR HFA/PROVENTIL HFA/VENTOLIN HFA) 108 (90 Base) MCG/ACT inhaler as needed       aspirin 81 MG EC tablet        atorvastatin (LIPITOR) 40 MG tablet Take 40 mg by mouth daily       buPROPion (WELLBUTRIN SR) 150 MG 12 hr tablet 150 mg daily       buPROPion (ZYBAN) 150 MG 12 hr tablet Take 150 mg by mouth       furosemide (LASIX) 40 MG tablet Take 2 tablets (80 mg) by mouth daily 180 tablet 3     Multiple Vitamin (MULTI VITAMIN  MENS) TABS Take 1 tablet by mouth daily       naltrexone (DEPADE/REVIA) 50 MG tablet TAKE 1/2 TABLET BY MOUTH ONCE DAILY       pramipexole (MIRAPEX) 0.25 MG tablet TAKE 1 OR 2 TABLETS BY MOUTH ONCE DAILY FOR 30 DAYS       traZODone (DESYREL) 50 MG tablet daily       TRELEGY ELLIPTA 200-62.5-25 MCG/INH oral inhaler INHALE 1 PUFF BY MOUTH EVERY DAY       SPIRIVA RESPIMAT 2.5 MCG/ACT inhaler daily       spironolactone (ALDACTONE) 25 MG tablet Take 25 mg by mouth daily       SYMBICORT 160-4.5 MCG/ACT Inhaler daily         ALLERGIES   No Known Allergies    PAST MEDICAL HISTORY:  Past Medical History:    Diagnosis Date     CHF (congestive heart failure) (H)      Cor pulmonale (chronic) (H)      Depressive disorder, not elsewhere classified      H/O respiratory failure      Hyponatremia      Pulmonary hypertension (H)          Review of Systems:  Cardiovascular: negative for chest pain, palpitations, orthopnea, pos LE edema  Constitutional: negative for chills, sweats, fevers   Resp: Negative for dyspnea at rest, pos dyspnea on exertion, cough, known chronic lung disease  HEENT: Negative for new visual changes, frequent headaches  Gastrointestinal: negative for abdominal pain, diarrhea, nausea, vomiting  Hematologic/lymphatic: negative for current systemic anticoagulation, hx of blood clots  Neurological: negative for focal weakness, LOC, seizures, syncope/presyncope       Physical Exam:  Vitals: /80 (BP Location: Right arm, Patient Position: Sitting, Cuff Size: Adult Large)   Pulse 82   Wt 137 kg (302 lb)   SpO2 98%   BMI 40.96 kg/m     Wt Readings from Last 4 Encounters:   03/08/22 137 kg (302 lb)   09/27/21 137 kg (302 lb)   03/15/21 132 kg (291 lb)   12/30/20 133.8 kg (295 lb)       GEN:  In general, this is a well nourished  male in no acute distress on NC.  Patient ambulatory, unaccompanied.   HEENT:  Pupils grossly equal, sclerae nonicteric.   NECK: Supple, trachea midline. Difficult to assess JVD due to bear.   C/V:  Regular rate and rhythm, no murmur, rub or gallop. No S3 or RV heave.   RESP: Respirations are unlabored. No use of accessory muscles. He has scattered posterior faint end expiratory wheezes and occasional rales in the bases L>R.   EXTREM: 1+ bilateral pitting PT edema. No cyanosis or clubbing.  NEURO: Alert and oriented, cooperative. Gait not formally assessed. No obvious focal deficits.   SKIN: Warm and dry.       Recent Lab Results:  LIVER ENZYME RESULTS:  Lab Results   Component Value Date    AST 24 12/30/2020    ALT 30 12/30/2020       CBC RESULTS:  Lab Results    Component Value Date    WBC 6.5 03/08/2022    WBC 7.4 03/15/2021    RBC 4.08 (L) 03/08/2022    RBC 4.07 (L) 03/15/2021    HGB 12.4 (L) 03/08/2022    HGB 13.0 (L) 03/15/2021    HCT 39.9 (L) 03/08/2022    HCT 41.7 03/15/2021    MCV 98 03/08/2022     (H) 03/15/2021    MCH 30.4 03/08/2022    MCH 31.9 03/15/2021    MCHC 31.1 (L) 03/08/2022    MCHC 31.2 (L) 03/15/2021    RDW 15.7 (H) 03/08/2022    RDW 14.6 03/15/2021     03/08/2022     03/15/2021       BMP RESULTS:  Lab Results   Component Value Date     03/08/2022     03/15/2021    POTASSIUM 4.3 03/08/2022    POTASSIUM 4.4 03/15/2021    CHLORIDE 104 03/08/2022    CHLORIDE 104 03/15/2021    CO2 29 03/08/2022    CO2 30 03/15/2021    ANIONGAP 7 03/08/2022    ANIONGAP 5 03/15/2021     (H) 03/08/2022     (H) 03/15/2021    BUN 15 03/08/2022    BUN 15 03/15/2021    CR 1.11 03/08/2022    CR 1.11 03/15/2021    GFRESTIMATED 77 03/08/2022    GFRESTIMATED 73 03/15/2021    GFRESTBLACK 85 03/15/2021    VISHAL 9.0 03/08/2022    VISHAL 9.1 03/15/2021        Recent Labs   Lab Test 03/08/22  0927 09/27/21  0909 09/10/21  1111 03/15/21  1229   NTBNPI  --   --  10  --    NTBNP 29 10  --  81         Most recent testing:      Echocardiogram  3/15/21     Interpretation Summary  Technically difficult study.Extremely poor acoustic windows.  Left ventricular size is normal.  The Ejection Fraction is estimated at 50-55%.  Mild right ventricular dilation is present.  Global right ventricular function is mildly reduced.  Pulmonary artery systolic pressure cannot be assessed.  PV AT 102ms.  IVC diameter and respiratory changes fall into an intermediate range  suggesting an RA pressure of 8 mmHg.  No pericardial effusion is present.  There is no prior study for direct comparison.    [facility]  9/10/2021  Conclusion         Right sided filling pressures are mildly elevated.    Mild elevated pulmonary hypertension.    Left sided filling pressures are mildly  elevated.    Left ventricular filling pressures are mildly elevated.    Normal cardiac output level.     1.  Lesion in the RA 2 segment was noted with distal pruning of the vessel.  2.  Lesion in the LA 1 & LA 2 segments were noted.  3.  Rest of the segments appear disease-free.      NYHA Functional Class:  3    A total of 30 minutes was spent today performing chart and history review, gathering HPI, physical exam, pre and post visit documentation, and care coordination.    Felipa Rene PA-C  [facility]  Pager [phone]

## 2022-03-23 ENCOUNTER — LAB (OUTPATIENT)
Dept: LAB | Facility: CLINIC | Age: 57
End: 2022-03-23
Payer: COMMERCIAL

## 2022-03-23 DIAGNOSIS — R06.02 SOB (SHORTNESS OF BREATH): ICD-10-CM

## 2022-03-23 DIAGNOSIS — I27.20 PULMONARY HYPERTENSION (H): ICD-10-CM

## 2022-03-23 LAB
ANION GAP SERPL CALCULATED.3IONS-SCNC: 3 MMOL/L (ref 3–14)
BUN SERPL-MCNC: 12 MG/DL (ref 7–30)
CALCIUM SERPL-MCNC: 9 MG/DL (ref 8.5–10.1)
CHLORIDE BLD-SCNC: 101 MMOL/L (ref 94–109)
CO2 SERPL-SCNC: 35 MMOL/L (ref 20–32)
CREAT SERPL-MCNC: 1.02 MG/DL (ref 0.66–1.25)
GFR SERPL CREATININE-BSD FRML MDRD: 86 ML/MIN/1.73M2
GLUCOSE BLD-MCNC: 97 MG/DL (ref 70–99)
POTASSIUM BLD-SCNC: 4.5 MMOL/L (ref 3.4–5.3)
SODIUM SERPL-SCNC: 139 MMOL/L (ref 133–144)

## 2022-03-23 PROCEDURE — 36415 COLL VENOUS BLD VENIPUNCTURE: CPT

## 2022-03-23 PROCEDURE — 80048 BASIC METABOLIC PNL TOTAL CA: CPT

## 2022-03-25 ENCOUNTER — TELEPHONE (OUTPATIENT)
Dept: CARDIOLOGY | Facility: CLINIC | Age: 57
End: 2022-03-25
Payer: COMMERCIAL

## 2022-03-25 ENCOUNTER — MYC MEDICAL ADVICE (OUTPATIENT)
Dept: CARDIOLOGY | Facility: CLINIC | Age: 57
End: 2022-03-25
Payer: COMMERCIAL

## 2022-03-25 NOTE — TELEPHONE ENCOUNTER
Called and left a voicemail for patient to question if he is feeling better after increased diuretic dose. Sent a mychart to discuss WNL lab results and to check to see how he is doing as well      Ita Devries RN on 3/25/2022 at 2:27 PM

## 2022-04-30 ENCOUNTER — HEALTH MAINTENANCE LETTER (OUTPATIENT)
Age: 57
End: 2022-04-30

## 2022-05-30 ENCOUNTER — MYC MEDICAL ADVICE (OUTPATIENT)
Dept: CARDIOLOGY | Facility: CLINIC | Age: 57
End: 2022-05-30
Payer: COMMERCIAL

## 2022-08-28 ASSESSMENT — ENCOUNTER SYMPTOMS
HYPOTENSION: 0
WHEEZING: 0
MUSCLE WEAKNESS: 1
COUGH DISTURBING SLEEP: 0
POSTURAL DYSPNEA: 1
MUSCLE CRAMPS: 0
DYSPNEA ON EXERTION: 1
HEMOPTYSIS: 0
PALPITATIONS: 1
BACK PAIN: 0
SLEEP DISTURBANCES DUE TO BREATHING: 1
HYPERTENSION: 1
NECK PAIN: 1
ORTHOPNEA: 1
COUGH: 0
SYNCOPE: 1
SHORTNESS OF BREATH: 1
STIFFNESS: 1
ARTHRALGIAS: 1
JOINT SWELLING: 1
LIGHT-HEADEDNESS: 1
SPUTUM PRODUCTION: 0
EXERCISE INTOLERANCE: 1
SNORES LOUDLY: 1
LEG PAIN: 1
MYALGIAS: 1

## 2022-08-29 ENCOUNTER — OFFICE VISIT (OUTPATIENT)
Dept: CARDIOLOGY | Facility: CLINIC | Age: 57
End: 2022-08-29
Attending: PHYSICIAN ASSISTANT
Payer: COMMERCIAL

## 2022-08-29 ENCOUNTER — LAB (OUTPATIENT)
Dept: LAB | Facility: CLINIC | Age: 57
End: 2022-08-29
Payer: COMMERCIAL

## 2022-08-29 VITALS
HEART RATE: 100 BPM | WEIGHT: 315 LBS | DIASTOLIC BLOOD PRESSURE: 75 MMHG | SYSTOLIC BLOOD PRESSURE: 115 MMHG | OXYGEN SATURATION: 97 % | BODY MASS INDEX: 43.63 KG/M2

## 2022-08-29 DIAGNOSIS — R06.02 SOB (SHORTNESS OF BREATH): ICD-10-CM

## 2022-08-29 DIAGNOSIS — I27.20 PULMONARY HYPERTENSION (H): ICD-10-CM

## 2022-08-29 DIAGNOSIS — I50.31 ACUTE DIASTOLIC HEART FAILURE (H): Primary | ICD-10-CM

## 2022-08-29 LAB
ALBUMIN SERPL-MCNC: 3.4 G/DL (ref 3.4–5)
ALP SERPL-CCNC: 158 U/L (ref 40–150)
ALT SERPL W P-5'-P-CCNC: 70 U/L (ref 0–70)
ANION GAP SERPL CALCULATED.3IONS-SCNC: 4 MMOL/L (ref 3–14)
AST SERPL W P-5'-P-CCNC: 44 U/L (ref 0–45)
BILIRUB SERPL-MCNC: 0.3 MG/DL (ref 0.2–1.3)
BUN SERPL-MCNC: 20 MG/DL (ref 7–30)
CALCIUM SERPL-MCNC: 8.5 MG/DL (ref 8.5–10.1)
CHLORIDE BLD-SCNC: 104 MMOL/L (ref 94–109)
CO2 SERPL-SCNC: 30 MMOL/L (ref 20–32)
CREAT SERPL-MCNC: 0.97 MG/DL (ref 0.66–1.25)
ERYTHROCYTE [DISTWIDTH] IN BLOOD BY AUTOMATED COUNT: 15.8 % (ref 10–15)
GFR SERPL CREATININE-BSD FRML MDRD: 90 ML/MIN/1.73M2
GLUCOSE BLD-MCNC: 180 MG/DL (ref 70–99)
HCT VFR BLD AUTO: 38.9 % (ref 40–53)
HGB BLD-MCNC: 12 G/DL (ref 13.3–17.7)
MCH RBC QN AUTO: 30.5 PG (ref 26.5–33)
MCHC RBC AUTO-ENTMCNC: 30.8 G/DL (ref 31.5–36.5)
MCV RBC AUTO: 99 FL (ref 78–100)
NT-PROBNP SERPL-MCNC: 20 PG/ML (ref 0–900)
PLATELET # BLD AUTO: 205 10E3/UL (ref 150–450)
POTASSIUM BLD-SCNC: 4.5 MMOL/L (ref 3.4–5.3)
PROT SERPL-MCNC: 7.6 G/DL (ref 6.8–8.8)
RBC # BLD AUTO: 3.94 10E6/UL (ref 4.4–5.9)
SODIUM SERPL-SCNC: 138 MMOL/L (ref 133–144)
WBC # BLD AUTO: 7.3 10E3/UL (ref 4–11)

## 2022-08-29 PROCEDURE — 80053 COMPREHEN METABOLIC PANEL: CPT | Performed by: PATHOLOGY

## 2022-08-29 PROCEDURE — 85027 COMPLETE CBC AUTOMATED: CPT | Performed by: PATHOLOGY

## 2022-08-29 PROCEDURE — 36415 COLL VENOUS BLD VENIPUNCTURE: CPT | Performed by: PATHOLOGY

## 2022-08-29 PROCEDURE — G0463 HOSPITAL OUTPT CLINIC VISIT: HCPCS

## 2022-08-29 PROCEDURE — 83880 ASSAY OF NATRIURETIC PEPTIDE: CPT | Performed by: PATHOLOGY

## 2022-08-29 PROCEDURE — 99215 OFFICE O/P EST HI 40 MIN: CPT | Performed by: INTERNAL MEDICINE

## 2022-08-29 RX ORDER — GUAIFENESIN 600 MG/1
1 TABLET ORAL 2 TIMES DAILY
COMMUNITY
Start: 2022-08-18 | End: 2023-10-04

## 2022-08-29 RX ORDER — LIDOCAINE 40 MG/G
CREAM TOPICAL
Status: CANCELLED | OUTPATIENT
Start: 2022-08-29

## 2022-08-29 RX ORDER — BUPROPION HYDROCHLORIDE 200 MG/1
200 TABLET, EXTENDED RELEASE ORAL 2 TIMES DAILY
COMMUNITY
Start: 2022-06-14

## 2022-08-29 RX ORDER — TORSEMIDE 100 MG/1
TABLET ORAL
COMMUNITY
Start: 2022-08-08 | End: 2024-07-03

## 2022-08-29 ASSESSMENT — PAIN SCALES - GENERAL: PAINLEVEL: NO PAIN (0)

## 2022-08-29 NOTE — NURSING NOTE
Chief Complaint   Patient presents with     Follow Up     Return Pulmonary Hypertension- Return 4 month PH follow up w/labs prior.     Vitals were taken and medications reconciled.    Damian London, MARS  10:30 AM

## 2022-08-29 NOTE — NURSING NOTE
Patient educated to the following during visit and educated to contact RN or MD for any questions.     Procedures and/or Testing: Patient given instructions regarding  Echocardiogram, . Discussed purpose, preparation, procedure and when to expect results reported back to the patient. Patient demonstrated understanding of this information and agreed to call with further questions or concerns.  Right Heart Catheterization: Patient was instructed regarding right heart catheterization, including discussion of the procedure, preparation, intra-procedural steps, and recovery at home. Patient demonstrated understanding of this information and agreed to call with further questions or concerns.   Med Reconcile: Reviewed and verified all current medications with the patient. The updated medication list was printed and given to the patient.  Labs: Patient was given results of the laboratory testing obtained today. Patient demonstrated understanding of this information and agreed to call with further questions or concerns.   Diet: Patient instructed regarding a heart healthy diet, including discussion of reduced fat and sodium intake. Patient demonstrated understanding of this information and agreed to call with further questions or concerns.     Plan:   Medication Changes:  Start on Jardiance 10mg (1 tablet ) daily    Follow up Appointment Information:  Schedule for an echocardiogram and right heart catheterization with Dr. Das    Patient stated he understood all health information given and agreed to call with further questions or concerns.      Patient scheduled in clinic      *Mandatory COVID Testing:   We require COVID testing prior to their procedure regardless of vaccination status.     This can be completed via PCR or Rapid (at lab or at home).  If you are doing an at home test please complete 1-2 days prior.  If you are completing a PCR Lab, this can be completed no sooner than 4 days prior.    If you are staying  overnight a PCR completed at a LAB is required and a Rapid will not be accepted.    To schedule COVID testing at a [facility] lab, please call [phone] ([phone])    If completing the COVID Test at an Outside facility please have them fax the results to [phone]    Please bring in a picture of your results if a rapid is completed at home.    If you are running into and issues please call us.     You can order at home tests from:  CovidTests.gov  Or you can call: [place] Department of Health COVID-19 Public Hotline at 1-875.804.6906      Right Heart Catheterization    Time: Plan for up to 3 hours for procedure preparation, testing and after care.  Location: [facility], [address] [place] [address], [place] [address].   Check in: Gold Waiting Area. 1st floor entrance, directly down the white.     A physician will come and talk with you about the procedure and obtain consent.  A nurse from the Cardiac Catheterization Lab will then escort you to the procedure area. You will receive a shot to numb the site where the catheter (tube) will enter your body.  This may be in the neck or groin - but likely your neck.  You will not receive sedation or anesthesia.   After the procedure you will recover for a short period and then be discharged.    Pre procedure instructions:     1.  Do not eat any solid food or milk products for 6 hours prior to the exam. You may drink clear liquids until 2 hours prior to the exam. Clear liquids include the following: water, Jell-O, clear broth, apple juice or any non-carbonated drink that you can see through (no soda).   2. Do not drink alcohol or smoke 24 hours prior to test.  3. Hold these meds: None       Do not take your oral diabetic medication or short acting insulin the day of the procedure.      4. You may take your other morning medications as prescribed with a sip of water. You may hold your diuretic that morning.   5. Please arrange for a ride  to drop you off and pick you up in the instance you are unable to drive home, however you should be able to function as you normally would after the procedure  6. Take your temperature in the morning prior to coming in.  If your temperature is 100 F please call [phone] (Opt[address] 1) and notify them.  If you do not have access to a thermometer at home, please come in for testing.  If you are running a temperature your procedure may be rescheduled.

## 2022-08-29 NOTE — LETTER
2022      RE: Giuseppe Linton  [address]  [place] [place] [facility] [address]       Dear Colleague,    Thank you for the opportunity to participate in the care of your patient, Giuseppe Linton, at the [facility]. Please see a copy of my visit note below.    Service Date: 2022    Ghada Pop MD   [facility]   [address], [address] [address]     RE:  Giuseppe Linton   MRN:  [id number]  :  1965      Dear Dr. Pop:     We had the pleasure of seeing Giuseppe Linton at the [facility].  As you know, Mr. Linton is a very pleasant 57 year old male with moderate pulmonary hypertension that is likely multifactorial in the setting of emphysema, methamphetamine use, obesity, high output and mild diastolic dysfunction.  He returns today for 6 month follow-up.      He is having increasing shortness of breath in the last month.  He feels like he is fluid overloaded.  He feels tightness in his upper extremities, belly and lower extremity.  He is not able to do even his usual activities.  He is not able to walk more than 100 feet.  He is currently functional class IIIb.  He is using supplemental oxygen 6 L at rest as well as with exertion.  No chest pain or chest pressure.  No presyncopal or syncopal episodes.  He has also been having worsening cough.  Unfortunately he started smoking in the last several months.  He states that he has been watching his salt closely but still eats processed food with low salt.  He has gained close to 20 pounds.  It is unclear that this is all fluid or table weight.    In light of the symptoms his primary care physician switched him from furosemide to torsemide.  His urine output improved but has not had any significant improvement in his shortness of breath.  No other specific symptoms.    PAST MEDICAL  HISTORY:  Past Medical History:   Diagnosis Date     CHF (congestive heart failure) (H)      Cor pulmonale (chronic) (H)      Depressive disorder, not elsewhere classified      H/O respiratory failure      Hyponatremia      Pulmonary hypertension (H)        CURRENT MEDICATIONS:  Current Outpatient Medications   Medication Sig     acetaminophen (TYLENOL) 325 MG tablet Take 325-650 mg by mouth every 6 hours as needed for mild pain     albuterol (PROAIR HFA/PROVENTIL HFA/VENTOLIN HFA) 108 (90 Base) MCG/ACT inhaler as needed     aspirin 81 MG EC tablet      atorvastatin (LIPITOR) 40 MG tablet Take 40 mg by mouth daily     buPROPion (WELLBUTRIN SR) 150 MG 12 hr tablet 150 mg daily     buPROPion (ZYBAN) 150 MG 12 hr tablet Take 150 mg by mouth     furosemide (LASIX) 40 MG tablet Take 2 tablets (80 mg) by mouth daily     Multiple Vitamin (MULTI VITAMIN  MENS) TABS Take 1 tablet by mouth daily     naltrexone (DEPADE/REVIA) 50 MG tablet TAKE 1/2 TABLET BY MOUTH ONCE DAILY     pramipexole (MIRAPEX) 0.25 MG tablet TAKE 1 OR 2 TABLETS BY MOUTH ONCE DAILY FOR 30 DAYS     SPIRIVA RESPIMAT 2.5 MCG/ACT inhaler daily     spironolactone (ALDACTONE) 25 MG tablet Take 25 mg by mouth daily     SYMBICORT 160-4.5 MCG/ACT Inhaler daily     traZODone (DESYREL) 50 MG tablet daily     TRELEGY ELLIPTA 200-62.5-25 MCG/INH oral inhaler INHALE 1 PUFF BY MOUTH EVERY DAY     No current facility-administered medications for this visit.     Facility-Administered Medications Ordered in Other Visits   Medication     sodium chloride (PF) 0.9% PF flush 10 mL       ROS:   10 point ROS negative except as discussed in above HPI.    EXAM:  /75 (BP Location: Left arm, Patient Position: Chair, Cuff Size: Adult Large)   Pulse 100   Wt 145.9 kg (321 lb 11.2 oz)   SpO2 97%   BMI 43.63 kg/m    General: appears comfortable, alert and articulate  He had no pallor, cyanosis or jaundice.  I could not clearly appreciate a jugular venous distention on his neck  exam.  His carotids were 1+ bilaterally.  His pulses were regular in rhythm.  Cardiac auscultation revealed normal S1-S2 no murmur rub or gallop.  Auscultation of the lungs revealed equal air entry on both sides with no added sounds.  His abdomen was soft with normal also no tenderness no rigidity no guarding.  He had no focal neurological deficit.  His extremities showed no edema.    Labs:  Recent Results (from the past 48 hour(s))   CBC with platelets    Collection Time: 08/29/22  9:58 AM   Result Value Ref Range    WBC Count 7.3 4.0 - 11.0 10e3/uL    RBC Count 3.94 (L) 4.40 - 5.90 10e6/uL    Hemoglobin 12.0 (L) 13.3 - 17.7 g/dL    Hematocrit 38.9 (L) 40.0 - 53.0 %    MCV 99 78 - 100 fL    MCH 30.5 26.5 - 33.0 pg    MCHC 30.8 (L) 31.5 - 36.5 g/dL    RDW 15.8 (H) 10.0 - 15.0 %    Platelet Count 205 150 - 450 10e3/uL       Recent Labs   Lab Test 08/29/22  0958 03/23/22  1326    139   POTASSIUM 4.5 4.5   CHLORIDE 104 101   CO2 30 35*   ANIONGAP 4 3   * 97   BUN 20 12   CR 0.97 1.02   VISHAL 8.5 9.0     Liver Function Studies - Recent Labs   Lab Test 08/29/22  0958   PROTTOTAL 7.6   ALBUMIN 3.4   BILITOTAL 0.3   ALKPHOS 158*   AST 44   ALT 70     Lab Results   Component Value Date    NTBNPI 10 09/10/2021     Lab Results   Component Value Date    NTBNP 20 08/29/2022    NTBNP 81 03/15/2021       Echocardiogram 3/15/21:  Technically difficult study.Extremely poor acoustic windows.  Left ventricular size is normal.  The Ejection Fraction is estimated at 50-55%.  Mild right ventricular dilation is present.  Global right ventricular function is mildly reduced.  Pulmonary artery systolic pressure cannot be assessed.  PV AT 102ms.  IVC diameter and respiratory changes fall into an intermediate range  suggesting an RA pressure of 8 mmHg.  No pericardial effusion is present.  There is no prior study for direct comparison.    RHC and pulmonary angiogram 9/10/21:  RA: 15  RV: 52/15  PA: 52/27 (35)  PCWP: 21  TD CO/CI:  7.7/3.1  Leonel CO/CI: 8.0/3.2  PVR: 1.8    Per our review, pulmonary angiogram showed segmental/subsegmental lesions in right A1/A2 that correspond to an area of emphysematous bleb.    NM lung perfusion scan 12/30/20:  1. Small subsegmental wedge-shaped perfusion deficits in the lower  lobes bilaterally, technically age-indeterminant. If the D-dimer is  not elevated, this can support the diagnosis of chronic thromboembolic  disease.   2. Mild cardiomegaly.  3. Ectasia of the main pulmonary artery to 4.0 cm which can be seen  with pulmonary hypertension.  4. Scattered pulmonary nodules, better seen on comparison diagnostic  CT 10/1/2020. The 1.4 cm nodular density in the right lower lobe is  partially obscured on this study by motion artifact. Recommend  continued surveillance with diagnostic CT chest.  5. Partially calcified pleural plaques suggesting history of exposure  to asbestos.    CTA chest at [facility] 10/1/20:      6MWT 3/15/21: Ambulated 283 meters, desaturated to 83% while on 6 L/min oxymizer      Assessment and Plan:     Giuseppe Linton is a very pleasant 57 year old male with moderate pulmonary hypertension of multifactorial etiology including emphysema, methamphetamine use, obesity, high output state, and mild diastolic dysfunction who returns today for follow-up.       Clinically, he is worse.  He appears to be in decompensated heart failure with volume overload.  It is unclear to me how much of his weight gain is stable weight versus fluid.  He has been switched from furosemide to torsemide 100mg agree.  Given the recent data on sodium glucose cord transport receptor inhibitor in patients with diastolic dysfunction, we will start him on empagliflozin 10 mg daily.  We will schedule him for a repeat right heart catheterization to have an accurate assessment of his volume status as well as repeat an echocardiogram to make sure that there is no interim structural changes.      I again discussed  the importance of low-salt diet and fluid restriction.  While he has started smoking again, he is not using methamphetamine or marijuana.      He is currently being managed by pulmonology at [facility] for his emphysema with Dr. Ghada Pop.  He is on Spiriva and albuterol inhaler.      It was a pleasure seeing Giuseppe Linton at the [facility]. Please contact us with any questions or concerns that you may have.    Total time today was 41 minutes reviewing notes, imaging, labs, patient visit, orders and documentation.    Sincerely,    Thiago Das MD   [facility]  Heart Failure, Transplant, and Mechanical Circulatory Support Cardiology   Cardiovascular Division  [facility]   [phone]

## 2022-08-29 NOTE — PROGRESS NOTES
Service Date: 2022    Ghada Pop MD   [facility]   [address], [place] [address]     RE:  Giuseppe Linton   MRN:  [id number]  :  1965      Dear Dr. Pop:     We had the pleasure of seeing Giuseppe Linton at the [facility].  As you know, Mr. Linton is a very pleasant 57 year old male with moderate pulmonary hypertension that is likely multifactorial in the setting of emphysema, methamphetamine use, obesity, high output and mild diastolic dysfunction.  He returns today for 6 month follow-up.      He is having increasing shortness of breath in the last month.  He feels like he is fluid overloaded.  He feels tightness in his upper extremities, belly and lower extremity.  He is not able to do even his usual activities.  He is not able to walk more than 100 feet.  He is currently functional class IIIb.  He is using supplemental oxygen 6 L at rest as well as with exertion.  No chest pain or chest pressure.  No presyncopal or syncopal episodes.  He has also been having worsening cough.  Unfortunately he started smoking in the last several months.  He states that he has been watching his salt closely but still eats processed food with low salt.  He has gained close to 20 pounds.  It is unclear that this is all fluid or table weight.    In light of the symptoms his primary care physician switched him from furosemide to torsemide.  His urine output improved but has not had any significant improvement in his shortness of breath.  No other specific symptoms.    PAST MEDICAL HISTORY:  Past Medical History:   Diagnosis Date     CHF (congestive heart failure) (H)      Cor pulmonale (chronic) (H)      Depressive disorder, not elsewhere classified      H/O respiratory failure      Hyponatremia      Pulmonary hypertension (H)        CURRENT MEDICATIONS:  Current Outpatient Medications   Medication Sig     acetaminophen  (TYLENOL) 325 MG tablet Take 325-650 mg by mouth every 6 hours as needed for mild pain     albuterol (PROAIR HFA/PROVENTIL HFA/VENTOLIN HFA) 108 (90 Base) MCG/ACT inhaler as needed     aspirin 81 MG EC tablet      atorvastatin (LIPITOR) 40 MG tablet Take 40 mg by mouth daily     buPROPion (WELLBUTRIN SR) 150 MG 12 hr tablet 150 mg daily     buPROPion (ZYBAN) 150 MG 12 hr tablet Take 150 mg by mouth     furosemide (LASIX) 40 MG tablet Take 2 tablets (80 mg) by mouth daily     Multiple Vitamin (MULTI VITAMIN  MENS) TABS Take 1 tablet by mouth daily     naltrexone (DEPADE/REVIA) 50 MG tablet TAKE 1/2 TABLET BY MOUTH ONCE DAILY     pramipexole (MIRAPEX) 0.25 MG tablet TAKE 1 OR 2 TABLETS BY MOUTH ONCE DAILY FOR 30 DAYS     SPIRIVA RESPIMAT 2.5 MCG/ACT inhaler daily     spironolactone (ALDACTONE) 25 MG tablet Take 25 mg by mouth daily     SYMBICORT 160-4.5 MCG/ACT Inhaler daily     traZODone (DESYREL) 50 MG tablet daily     TRELEGY ELLIPTA 200-62.5-25 MCG/INH oral inhaler INHALE 1 PUFF BY MOUTH EVERY DAY     No current facility-administered medications for this visit.     Facility-Administered Medications Ordered in Other Visits   Medication     sodium chloride (PF) 0.9% PF flush 10 mL       ROS:   10 point ROS negative except as discussed in above HPI.    EXAM:  /75 (BP Location: Left arm, Patient Position: Chair, Cuff Size: Adult Large)   Pulse 100   Wt 145.9 kg (321 lb 11.2 oz)   SpO2 97%   BMI 43.63 kg/m    General: appears comfortable, alert and articulate  He had no pallor, cyanosis or jaundice.  I could not clearly appreciate a jugular venous distention on his neck exam.  His carotids were 1+ bilaterally.  His pulses were regular in rhythm.  Cardiac auscultation revealed normal S1-S2 no murmur rub or gallop.  Auscultation of the lungs revealed equal air entry on both sides with no added sounds.  His abdomen was soft with normal also no tenderness no rigidity no guarding.  He had no focal neurological  deficit.  His extremities showed no edema.    Labs:  Recent Results (from the past 48 hour(s))   CBC with platelets    Collection Time: 08/29/22  9:58 AM   Result Value Ref Range    WBC Count 7.3 4.0 - 11.0 10e3/uL    RBC Count 3.94 (L) 4.40 - 5.90 10e6/uL    Hemoglobin 12.0 (L) 13.3 - 17.7 g/dL    Hematocrit 38.9 (L) 40.0 - 53.0 %    MCV 99 78 - 100 fL    MCH 30.5 26.5 - 33.0 pg    MCHC 30.8 (L) 31.5 - 36.5 g/dL    RDW 15.8 (H) 10.0 - 15.0 %    Platelet Count 205 150 - 450 10e3/uL       Recent Labs   Lab Test 08/29/22  0958 03/23/22  1326    139   POTASSIUM 4.5 4.5   CHLORIDE 104 101   CO2 30 35*   ANIONGAP 4 3   * 97   BUN 20 12   CR 0.97 1.02   VISHAL 8.5 9.0     Liver Function Studies - Recent Labs   Lab Test 08/29/22  0958   PROTTOTAL 7.6   ALBUMIN 3.4   BILITOTAL 0.3   ALKPHOS 158*   AST 44   ALT 70     Lab Results   Component Value Date    NTBNPI 10 09/10/2021     Lab Results   Component Value Date    NTBNP 20 08/29/2022    NTBNP 81 03/15/2021       Echocardiogram 3/15/21:  Technically difficult study.Extremely poor acoustic windows.  Left ventricular size is normal.  The Ejection Fraction is estimated at 50-55%.  Mild right ventricular dilation is present.  Global right ventricular function is mildly reduced.  Pulmonary artery systolic pressure cannot be assessed.  PV AT 102ms.  IVC diameter and respiratory changes fall into an intermediate range  suggesting an RA pressure of 8 mmHg.  No pericardial effusion is present.  There is no prior study for direct comparison.    RHC and pulmonary angiogram 9/10/21:  RA: 15  RV: 52/15  PA: 52/27 (35)  PCWP: 21  TD CO/CI: 7.7/3.1  Leonel CO/CI: 8.0/3.2  PVR: 1.8    Per our review, pulmonary angiogram showed segmental/subsegmental lesions in right A1/A2 that correspond to an area of emphysematous bleb.    NM lung perfusion scan 12/30/20:  1. Small subsegmental wedge-shaped perfusion deficits in the lower  lobes bilaterally, technically age-indeterminant. If the  D-dimer is  not elevated, this can support the diagnosis of chronic thromboembolic  disease.   2. Mild cardiomegaly.  3. Ectasia of the main pulmonary artery to 4.0 cm which can be seen  with pulmonary hypertension.  4. Scattered pulmonary nodules, better seen on comparison diagnostic  CT 10/1/2020. The 1.4 cm nodular density in the right lower lobe is  partially obscured on this study by motion artifact. Recommend  continued surveillance with diagnostic CT chest.  5. Partially calcified pleural plaques suggesting history of exposure  to asbestos.    CTA chest at [facility] 10/1/20:      6MWT 3/15/21: Ambulated 283 meters, desaturated to 83% while on 6 L/min oxymizer      Assessment and Plan:     Giuseppe Linton is a very pleasant 57 year old male with moderate pulmonary hypertension of multifactorial etiology including emphysema, methamphetamine use, obesity, high output state, and mild diastolic dysfunction who returns today for follow-up.       Clinically, he is worse.  He appears to be in decompensated heart failure with volume overload.  It is unclear to me how much of his weight gain is stable weight versus fluid.  He has been switched from furosemide to torsemide 100mg agree.  Given the recent data on sodium glucose cord transport receptor inhibitor in patients with diastolic dysfunction, we will start him on empagliflozin 10 mg daily.  We will schedule him for a repeat right heart catheterization to have an accurate assessment of his volume status as well as repeat an echocardiogram to make sure that there is no interim structural changes.      I again discussed the importance of low-salt diet and fluid restriction.  While he has started smoking again, he is not using methamphetamine or marijuana.      He is currently being managed by pulmonology at [facility] for his emphysema with Dr. Ghada Pop.  He is on Spiriva and albuterol inhaler.      It was a pleasure seeing Giuseppe Linton at the  [facility]. Please contact us with any questions or concerns that you may have.    Total time today was 41 minutes reviewing notes, imaging, labs, patient visit, orders and documentation.    Sincerely,    Thiago Das MD   [facility]  Heart Failure, Transplant, and Mechanical Circulatory Support Cardiology   Cardiovascular Division  [facility]   [phone]

## 2022-08-29 NOTE — PATIENT INSTRUCTIONS
Medication Changes:  Start on Jardiance 10mg (1 tablet ) daily    Follow up Appointment Information:  Echo and Right heart catheterization on September 21st.     Results:   Latest Reference Range & Units 08/29/22 09:58   Sodium 133 - 144 mmol/L 138   Potassium 3.4 - 5.3 mmol/L 4.5   Chloride 94 - 109 mmol/L 104   Carbon Dioxide 20 - 32 mmol/L 30   Urea Nitrogen 7 - 30 mg/dL 20   Creatinine 0.66 - 1.25 mg/dL 0.97   GFR Estimate >60 mL/min/1.73m2 90   Calcium 8.5 - 10.1 mg/dL 8.5   Anion Gap 3 - 14 mmol/L 4   Albumin 3.4 - 5.0 g/dL 3.4   Protein Total 6.8 - 8.8 g/dL 7.6   Alkaline Phosphatase 40 - 150 U/L 158 (H)   ALT 0 - 70 U/L 70   AST 0 - 45 U/L 44   Bilirubin Total 0.2 - 1.3 mg/dL 0.3   N-Terminal Pro Bnp 0 - 900 pg/mL 20   Glucose 70 - 99 mg/dL 180 (H)   WBC 4.0 - 11.0 10e3/uL 7.3   Hemoglobin 13.3 - 17.7 g/dL 12.0 (L)   Hematocrit 40.0 - 53.0 % 38.9 (L)   Platelet Count 150 - 450 10e3/uL 205   RBC Count 4.40 - 5.90 10e6/uL 3.94 (L)   MCV 78 - 100 fL 99   MCH 26.5 - 33.0 pg 30.5   MCHC 31.5 - 36.5 g/dL 30.8 (L)   RDW 10.0 - 15.0 % 15.8 (H)   (H): Data is abnormally high  (L): Data is abnormally low    *Mandatory COVID Testing: We require COVID testing prior to their procedure regardless of vaccination status.   This can be completed via PCR or Rapid (at lab or at home).  If you are doing an at home test please complete 1-2 days prior.  If you are completing a PCR Lab, this can be completed no sooner than 4 days prior.  If you are staying overnight a PCR completed at a LAB is required and a Rapid will not be accepted.  To schedule COVID testing at a [facility], please call [phone])  If completing the COVID Test at an Outside facility please have them fax the results to [phone]  Please bring in a picture of your results if a rapid is completed at home.  If you are running into and issues please call us.     You can order at home tests from:  CovidTests.gov  Or you can call: [place]  Department of Health COVID-19 Public Hotline at 1-574.167.2252      Right Heart Catheterization    Time: Plan for up to 3 hours for procedure preparation, testing and after care.  Location: [facility], [address] [place] [address], [place] [address].   Check in: Gold Waiting Area. 1st floor entrance, directly down the white.     A physician will come and talk with you about the procedure and obtain consent.  A nurse from the Cardiac Catheterization Lab will then escort you to the procedure area. You will receive a shot to numb the site where the catheter (tube) will enter your body.  This may be in the neck or groin - but likely your neck.  You will not receive sedation or anesthesia.   After the procedure you will recover for a short period and then be discharged.    Pre procedure instructions:     1.  Do not eat any solid food or milk products for 6 hours prior to the exam. You may drink clear liquids until 2 hours prior to the exam. Clear liquids include the following: water, Jell-O, clear broth, apple juice or any non-carbonated drink that you can see through (no soda).   2. Do not drink alcohol or smoke 24 hours prior to test.  3. Hold these meds: None       Do not take your oral diabetic medication or short acting insulin the day of the procedure.      4. You may take your other morning medications as prescribed with a sip of water. You may hold your diuretic that morning.   5. Please arrange for a ride to drop you off and pick you up in the instance you are unable to drive home, however you should be able to function as you normally would after the procedure  6. Take your temperature in the morning prior to coming in.  If your temperature is 100 F please call [phone] (Opt. 1) and notify them.  If you do not have access to a thermometer at home, please come in for testing.  If you are running a temperature your procedure may be rescheduled.          We are located on the  third floor of the [facility] on the [facility].  Our address is     [address] [place] [address] [place] [address] [place] [address]    Thank you for allowing us to be a part of your care here at the [facility]    If you have questions or concerns please contact us at:    Oleg Harley RN, BSN   Amelia Olivarez (Schedule,Prior Auth)  Nurse Coordinator     Clinic   Pulmonary Hypertension   Pulmonary Hypertension  [facility]  [facility]  (Phone)[phone]    (Phone) [phone]        (Fax) [phone]    ** Please note that you will NOT receive a reminder call regarding your scheduled testing, reminder calls are for provider appointments only.  If you are scheduled for testing within the [employer] system you may receive a call regarding pre-registration for billing purposes only.**     Remember to weigh yourself daily after voiding and before you consume any food or beverages and log the numbers.  If you have gained/lost 2 pounds overnight or 5 pounds in a week contact us immediately for medication adjustments or further instructions.   **Please call us immediately if you have any syncope, chest pain, edema, or decline in your functional status.    Support Group:  Pulmonary Hypertension Association  Https://www.phassociation.org/  **Look at the Events Tab** They even have Support Groups that you can call into    [facility] PH Support Group  Second Saturday of the Month from 1-3 PM   Location: [address] [place] [address]  Leader: Adenike Pastrana   Phone: [phone]  Email: [email or web address]

## 2022-09-16 ENCOUNTER — TELEPHONE (OUTPATIENT)
Dept: CARDIOLOGY | Facility: CLINIC | Age: 57
End: 2022-09-16

## 2022-09-16 NOTE — TELEPHONE ENCOUNTER
[employer] message sent with pre-procedure instructions. Queenie Harley RN on 2022 at 8:15 AM      ----- Message from Ita Devries RN sent at 2022  4:48 PM CDT -----  Regardin/29 plan  Started on Jardiance- no copay yay!!    Scheduled for a echo and RHC on - will need to go over instructions. I did give in clinic but will need a refresher as well. He has a home test he will use

## 2022-09-20 ENCOUNTER — TELEPHONE (OUTPATIENT)
Dept: CARDIOLOGY | Facility: CLINIC | Age: 57
End: 2022-09-20

## 2022-09-20 NOTE — TELEPHONE ENCOUNTER
Left voicemail for patient to complete Travel Screen for Cardiac Cath Lab appointment on 9/21 and inform patient of updated Visitor Policy.     Informed pt on message of need for covid test 1-2 days prior and to take picture of results.

## 2022-09-21 ENCOUNTER — ALLIED HEALTH/NURSE VISIT (OUTPATIENT)
Dept: RESEARCH | Facility: CLINIC | Age: 57
End: 2022-09-21

## 2022-09-21 ENCOUNTER — APPOINTMENT (OUTPATIENT)
Dept: LAB | Facility: CLINIC | Age: 57
End: 2022-09-21
Attending: INTERNAL MEDICINE
Payer: COMMERCIAL

## 2022-09-21 ENCOUNTER — HOSPITAL ENCOUNTER (OUTPATIENT)
Facility: CLINIC | Age: 57
Discharge: HOME | End: 2022-09-21
Attending: INTERNAL MEDICINE | Admitting: INTERNAL MEDICINE
Payer: COMMERCIAL

## 2022-09-21 ENCOUNTER — APPOINTMENT (OUTPATIENT)
Dept: MEDSURG UNIT | Facility: CLINIC | Age: 57
End: 2022-09-21
Attending: INTERNAL MEDICINE
Payer: COMMERCIAL

## 2022-09-21 ENCOUNTER — HOSPITAL ENCOUNTER (OUTPATIENT)
Dept: CARDIOLOGY | Facility: CLINIC | Age: 57
Discharge: HOME | End: 2022-09-21
Attending: INTERNAL MEDICINE
Payer: COMMERCIAL

## 2022-09-21 VITALS
HEART RATE: 99 BPM | RESPIRATION RATE: 18 BRPM | BODY MASS INDEX: 42.66 KG/M2 | WEIGHT: 315 LBS | OXYGEN SATURATION: 99 % | HEIGHT: 72 IN | TEMPERATURE: 98.1 F | SYSTOLIC BLOOD PRESSURE: 114 MMHG | DIASTOLIC BLOOD PRESSURE: 72 MMHG

## 2022-09-21 DIAGNOSIS — I27.20 PULMONARY HYPERTENSION (H): ICD-10-CM

## 2022-09-21 DIAGNOSIS — Z00.6 RESEARCH EXAM: Primary | ICD-10-CM

## 2022-09-21 LAB
ALBUMIN SERPL BCG-MCNC: 4.3 G/DL (ref 3.5–5.2)
ALP SERPL-CCNC: 160 U/L (ref 40–129)
ALT SERPL W P-5'-P-CCNC: 71 U/L (ref 10–50)
ANION GAP SERPL CALCULATED.3IONS-SCNC: 16 MMOL/L (ref 7–15)
AST SERPL W P-5'-P-CCNC: 51 U/L (ref 10–50)
BASOPHILS # BLD AUTO: 0 10E3/UL (ref 0–0.2)
BASOPHILS NFR BLD AUTO: 1 %
BILIRUB SERPL-MCNC: 0.3 MG/DL
BUN SERPL-MCNC: 20.3 MG/DL (ref 6–20)
CALCIUM SERPL-MCNC: 10.2 MG/DL (ref 8.6–10)
CHLORIDE SERPL-SCNC: 99 MMOL/L (ref 98–107)
CREAT SERPL-MCNC: 1.02 MG/DL (ref 0.67–1.17)
DEPRECATED HCO3 PLAS-SCNC: 25 MMOL/L (ref 22–29)
EOSINOPHIL # BLD AUTO: 0.2 10E3/UL (ref 0–0.7)
EOSINOPHIL NFR BLD AUTO: 3 %
ERYTHROCYTE [DISTWIDTH] IN BLOOD BY AUTOMATED COUNT: 15.9 % (ref 10–15)
GFR SERPL CREATININE-BSD FRML MDRD: 85 ML/MIN/1.73M2
GLUCOSE SERPL-MCNC: 155 MG/DL (ref 70–99)
HCT VFR BLD AUTO: 41.9 % (ref 40–53)
HGB BLD-MCNC: 13.1 G/DL (ref 13.3–17.7)
HGB BLD-MCNC: 13.1 G/DL (ref 13.3–17.7)
IMM GRANULOCYTES # BLD: 0.1 10E3/UL
IMM GRANULOCYTES NFR BLD: 1 %
INR PPP: 0.92 (ref 0.85–1.15)
LVEF ECHO: NORMAL
LYMPHOCYTES # BLD AUTO: 2.1 10E3/UL (ref 0.8–5.3)
LYMPHOCYTES NFR BLD AUTO: 26 %
MCH RBC QN AUTO: 30.7 PG (ref 26.5–33)
MCHC RBC AUTO-ENTMCNC: 31.3 G/DL (ref 31.5–36.5)
MCV RBC AUTO: 98 FL (ref 78–100)
MONOCYTES # BLD AUTO: 0.6 10E3/UL (ref 0–1.3)
MONOCYTES NFR BLD AUTO: 8 %
NEUTROPHILS # BLD AUTO: 5 10E3/UL (ref 1.6–8.3)
NEUTROPHILS NFR BLD AUTO: 61 %
NRBC # BLD AUTO: 0 10E3/UL
NRBC BLD AUTO-RTO: 0 /100
NT-PROBNP SERPL-MCNC: <5 PG/ML (ref 0–900)
OXYHGB MFR BLDV: 69 % (ref 92–100)
PLATELET # BLD AUTO: 220 10E3/UL (ref 150–450)
POTASSIUM SERPL-SCNC: 4.7 MMOL/L (ref 3.4–5.3)
PROT SERPL-MCNC: 7.7 G/DL (ref 6.4–8.3)
RBC # BLD AUTO: 4.27 10E6/UL (ref 4.4–5.9)
SODIUM SERPL-SCNC: 140 MMOL/L (ref 136–145)
WBC # BLD AUTO: 8 10E3/UL (ref 4–11)

## 2022-09-21 PROCEDURE — C1894 INTRO/SHEATH, NON-LASER: HCPCS | Performed by: INTERNAL MEDICINE

## 2022-09-21 PROCEDURE — 999N000142 HC STATISTIC PROCEDURE PREP ONLY

## 2022-09-21 PROCEDURE — 85018 HEMOGLOBIN: CPT

## 2022-09-21 PROCEDURE — 272N000001 HC OR GENERAL SUPPLY STERILE: Performed by: INTERNAL MEDICINE

## 2022-09-21 PROCEDURE — 36415 COLL VENOUS BLD VENIPUNCTURE: CPT | Performed by: INTERNAL MEDICINE

## 2022-09-21 PROCEDURE — 80053 COMPREHEN METABOLIC PANEL: CPT | Performed by: INTERNAL MEDICINE

## 2022-09-21 PROCEDURE — 93306 TTE W/DOPPLER COMPLETE: CPT

## 2022-09-21 PROCEDURE — 85610 PROTHROMBIN TIME: CPT | Performed by: INTERNAL MEDICINE

## 2022-09-21 PROCEDURE — 250N000009 HC RX 250: Performed by: INTERNAL MEDICINE

## 2022-09-21 PROCEDURE — 85025 COMPLETE CBC W/AUTO DIFF WBC: CPT | Performed by: INTERNAL MEDICINE

## 2022-09-21 PROCEDURE — 93451 RIGHT HEART CATH: CPT | Mod: 26 | Performed by: INTERNAL MEDICINE

## 2022-09-21 PROCEDURE — 999N000132 HC STATISTIC PP CARE STAGE 1

## 2022-09-21 PROCEDURE — 93451 RIGHT HEART CATH: CPT | Performed by: INTERNAL MEDICINE

## 2022-09-21 PROCEDURE — 83880 ASSAY OF NATRIURETIC PEPTIDE: CPT | Performed by: INTERNAL MEDICINE

## 2022-09-21 PROCEDURE — 93306 TTE W/DOPPLER COMPLETE: CPT | Mod: 26 | Performed by: INTERNAL MEDICINE

## 2022-09-21 PROCEDURE — 82810 BLOOD GASES O2 SAT ONLY: CPT

## 2022-09-21 RX ORDER — LIDOCAINE 40 MG/G
CREAM TOPICAL
Status: COMPLETED | OUTPATIENT
Start: 2022-09-21 | End: 2022-09-21

## 2022-09-21 RX ADMIN — LIDOCAINE: 40 CREAM TOPICAL at 14:08

## 2022-09-21 ASSESSMENT — ACTIVITIES OF DAILY LIVING (ADL)
ADLS_ACUITY_SCORE: 35
ADLS_ACUITY_SCORE: 35

## 2022-09-21 NOTE — DISCHARGE INSTRUCTIONS
[facility]                        Interventional Cardiology  Discharge Instructions   Post Right Heart Cath      AFTER YOU GO HOME:  DO drink plenty of fluids  DO resume your regular diet and medications unless otherwise instructed by your Primary Physician  Do Not scrub the procedure site vigorously  No lotion or powder to the puncture site for 3 days    CALL YOUR PRIMARY PHYSICIAN IF: You may resume all normal activity.  Monitor neck site for bleeding, swelling, or voice changes. If you notice bleeding or swelling immediately apply pressure to the site and call number below to speak with Cardiology Fellow.  If you experience any changes in your breathing you should call your doctor immediately or come to the closest Emergency Department.  Do not drive yourself.    ADDITIONAL INSTRUCTIONS: Medications: You are to resume all home medications including anticoagulation therapy unless otherwise advised by your primary cardiologist or nurse coordinator.    Follow Up: Per your primary cardiology team    If you have any questions or concerns regarding your procedure site please call [phone] at anytime and ask for Cardiology Fellow on call.  They are available 24 hours a day.  You may also contact the Cardiology Clinic after hours number at [phone].                                                       Telephone Numbers [phone] Monday-Friday 8:00 am to 4:30 pm    [phone] After 4:30 pm Monday-Friday, Weekends & Holidays  Ask for Interventional Cardiologist on call. Someone is on call 24 hours/day   [facility] toll free number [phone] Monday-Friday 8:00 am to 4:30 pm   [facility] Emergency Dept [phone]

## 2022-09-21 NOTE — PROGRESS NOTES
.Surgery Clinical Trials Office Informed Consent Process Documentation      Observational study to compare Non-Invasive Venous waveform Analysis (NIVA) with Pulmonary Capillary Wedge Pressure (PCWP) during cardiac catheterization.     Protocol Version 2.1, IRB Approved 04 APR 2022    PI:  Trey Gonzales MD    Date of Approach/Consent: 9/21/2022     I met with this patient today to discuss the NIVA study.    The subject was screened and meets all of the inclusion criteria and none of the exclusion criteria is met.      The subject was told:  -that the study involves research   -the purpose of the research study  -the expected duration of the study and the approximate number of subject sought  -of procedures that are identified as experimental  -of reasonably foreseeable risks or discomforts to the subject  -of any benefits to the subject or others that may be expected from the research  -of alternative procedures and/or treatment  -how the confidentiality of records would be maintained  -whether or not compensation and medical treatments are available should injury occur as a result of the study  -who to contact if they have questions related to the research study or questions regarding research subjects' rights  -that participation is completely voluntary and that their decision to or not to participate will have no impact on their relationships with the N and the staff    No study procedures were completed prior to the consent being obtained.  The use of historical information (lab or assessments) used for the purpose of the study was approved by subject.  The subject was fully aware that we would be reviewing their medical record for the study.    The subject demonstrated an understanding of what the study involved.  Specifically, how this study differed from standard of care at our center and what was required of the subject as part of the study.    The subject reviewed the consent form and was given the  opportunity to ask questions before signing.  Questions and concerns were answered by the study staff and/or study physician.    A copy of the signed informed consent document was provided to the subject.  [x] Yes [] No    The subject was offered a copy of the signed informed consent but declined. [] Yes [x] No    The consent required the use of a :   [] Yes [x]  No       A 'short form' consent was used:     [] Yes [x] No         If yes, provide name of witness:     The subject required a legally-authorized representative (LAR) to sign on their behalf:                                                    [] Yes  [x] No   If yes, please record name of LAR:     Questions to Evaluate Subject Comprehension of Study:    Question: Adequate Response? If No, explain what actions were taken   What is being studied? [x] Yes  [] No   If you participate, what will be different than if you decide not to participate?  [x] Yes  [] No   How long will the study last; will you be required to return for visits? [x] Yes  [] No   What kinds of risks are there? [x] Yes  [] No   Do you understand that you can withdraw consent at any time and for any reason while participating in the study? [x] Yes  [] No       Study Coordinators:  Jayleen Garcia  ([phone]), Edin Laird ([phone])                                                                            : Denise Moran  ([phone])

## 2022-09-21 NOTE — PRE-PROCEDURE
GENERAL PRE-PROCEDURE:   Procedure:  Right heart catheterization  Date/Time:  9/21/2022 2:29 PM    Verbal consent obtained?: Yes    Risks and benefits: Risks, benefits and alternatives were discussed    Consent given by:  Patient  Patient states understanding of procedure being performed: Yes    Patient's understanding of procedure matches consent: Yes    Procedure consent matches procedure scheduled: Yes    Appropriately NPO:  Yes  Mallampati  :  Grade 2- soft palate, base of uvula, tonsillar pillars, and portion of posterior pharyngeal wall visible  Lungs:  Lungs clear with good breath sounds bilaterally  Heart:  Normal heart sounds and rate  History & Physical reviewed:  History and physical reviewed and no updates needed  Statement of review:  I have reviewed the lab findings, diagnostic data, medications, and the plan for sedation

## 2022-09-21 NOTE — PROGRESS NOTES
Pt arrived for RHC. VSS on 6L NC (baseline per pt), denies pain. Lidocaine to RIJ site, covered with Tegaderm. Consent still needs to be signed. H&P current. Pt will drive themself home.

## 2022-09-21 NOTE — Clinical Note
dry, intact, no bleeding and no hematoma. 7f sheath removed from RIJ. Manual pressure held, hemostasis obtained, band aid applied.

## 2022-09-21 NOTE — PROGRESS NOTES
Pt arrived back from RHC procedure. VSS on 6L NC. Denies pain. RIJ site C/D/I, negative for a hematoma. Dr. Ely came and spoke with pt. Tolerating PO intake. Discharge paperwork reviewed with pt. Friend Bharathi will be picking pt up.

## 2022-09-22 ENCOUNTER — TELEPHONE (OUTPATIENT)
Dept: CARDIOLOGY | Facility: CLINIC | Age: 57
End: 2022-09-22

## 2022-09-22 DIAGNOSIS — I50.31 ACUTE DIASTOLIC HEART FAILURE (H): ICD-10-CM

## 2022-09-22 DIAGNOSIS — I27.20 PULMONARY HYPERTENSION (H): ICD-10-CM

## 2022-09-22 DIAGNOSIS — R06.02 SOB (SHORTNESS OF BREATH): Primary | ICD-10-CM

## 2022-09-22 DIAGNOSIS — I25.10 CORONARY ARTERY DISEASE: ICD-10-CM

## 2022-09-22 NOTE — PROGRESS NOTES
Giuseppe Linton is a very pleasant 57 year old male with moderate pulmonary hypertension of multifactorial etiology including emphysema, methamphetamine use, obesity, high output state, and mild diastolic dysfunction who returns today for follow-up.      We recently saw him in clinic.  He was struggling with fluid overload and heart failure symptoms.  In light of this we recommended him to have a repeat echocardiogram and a right heart catheterization.  We also started him on empagliflozin for heart failure with preserved ejection fraction.      His repeat right heart catheterization showed an RA pressure of 10, RV of 46/16, PA of 45/2 2 with a mean of 36, pulm capillary wedge pressure of 15 with a V wave up to 17, measured Leonel cardiac output of 8.9 with an index of 3.5, thermodilution cardiac about of 9.2 with an index of 3.6, and PA saturation 69.1%, and PVR of 2.4 Wood units.  No significant change in his hemodynamics when compared to last year's right heart catheterization.    His repeat echocardiogram showed mildly reduced left ventricular systolic function with an estimated ejection fraction of 45 to 50%.  His right ventricle was mildly dilated and mildly reduced in function.  His IVC could not be assessed.  He had no other significant valve abnormalities.  No pericardial effusion.    CBC RESULTS: Recent Labs   Lab Test 09/21/22  1450 09/21/22  1209   WBC  --  8.0   RBC  --  4.27*   HGB 13.1* 13.1*   HCT  --  41.9   MCV  --  98   MCH  --  30.7   MCHC  --  31.3*   RDW  --  15.9*   PLT  --  220     Recent Labs   Lab Test 09/21/22  1209 08/29/22  0958    138   POTASSIUM 4.7 4.5   CHLORIDE 99 104   CO2 25 30   ANIONGAP 16* 4   * 180*   BUN 20.3* 20   CR 1.02 0.97   VISHAL 10.2* 8.5     Liver Function Studies - Recent Labs   Lab Test 09/21/22  1209   PROTTOTAL 7.7   ALBUMIN 4.3   BILITOTAL 0.3   ALKPHOS 160*   AST 51*   ALT 71*     Lab Results   Component Value Date    NTBNPI <5 09/21/2022     Lab Results    Component Value Date    NTBNP 20 08/29/2022    NTBNP 81 03/15/2021     Impression    1. Mild combined pre and post pulmonary hypertension in the setting of obstructive sleep apnea, heart failure with preserved ejection fraction, and prior methamphetamine use  2.  Mild biventricular dysfunction  3.  Elevated biventricular filling pressure with preserved cardiac output    Plan  1.  No indication for pulmonary vasodilator therapy.  Mainly supportive therapy  2.  He is using CPAP therapy for his obstructive sleep apnea  3.  We will continue empagliflozin 10 mg for heart failure with preserved ejection fraction  4.  Discussed the importance of low-salt diet and fluid restriction  5.  Regular exercise and weight loss  6.  Noninvasive stress test to exclude myocardial ischemia given his mildly reduced LV ejection fraction he does have several coronary artery risk factors  7.  Follow-up in 3 months in [place]    Sincerely,  Thiago Das MD   [facility]  Heart Failure, Transplant, and Mechanical Circulatory Support Cardiology   Cardiovascular Division  [facility]   [phone]

## 2022-09-22 NOTE — TELEPHONE ENCOUNTER
Email sent to [facility] schedulers to schedule F/U appt. Queenie Harley RN on 9/22/2022 at 10:50 AM    ----- Message from Queenie Harley RN sent at 9/22/2022 10:28 AM CDT -----    ----- Message -----  From: Thiago Das MD  Sent: 9/22/2022  10:23 AM CDT  To: Cardiology Ph Nurse-    Team:    He completed his testing yesterday.  His pulmonary hypertension is very mild and has not worsened.  I suspect his symptoms are mainly due to obesity and dietary indiscretion.    To be thorough, we should make sure that he does not have coronary artery disease.    Plan    1.  Regadenoson myocardial perfusion stress test locally in [facility]  2.  No change in any of his other medications -I talked to him about weight loss, low-salt diet and exercise    3.  Return to clinic with me in 3 months in [place] as it is closer to him    Thank you    TT

## 2022-09-23 ENCOUNTER — MYC MEDICAL ADVICE (OUTPATIENT)
Dept: CARDIOLOGY | Facility: CLINIC | Age: 57
End: 2022-09-23

## 2022-09-26 NOTE — TELEPHONE ENCOUNTER
Orders for Lexiscan faxed to [facility] at (f) [phone]. Queenie Harley RN on 9/26/2022 at 8:38 AM

## 2022-11-19 ENCOUNTER — HEALTH MAINTENANCE LETTER (OUTPATIENT)
Age: 57
End: 2022-11-19

## 2023-04-04 ENCOUNTER — TELEPHONE (OUTPATIENT)
Dept: CARDIOLOGY | Facility: CLINIC | Age: 58
End: 2023-04-04
Payer: COMMERCIAL

## 2023-04-04 NOTE — TELEPHONE ENCOUNTER
Patient was seen by Dr. Das at [facility] on 4/4/2023.    Plan:  -Coronary Angiogram @ [facility] (Ask Dr. Damon to order) r/t slightly abnormal stress test.  -Cardiac Rehab through [facility]  -Labs prior to leaving clinic  -6 month follow up with Dr. Das in [place] with Labs prior    Additional Action needed:  N/A    [place] scheduling needs:   N/A      Plan was reviewed with the patient at time of the visit.  Patient verbalized understanding, agreed with plan and denied any further questions. Hannah Thompson RN on 4/4/2023 at 6:07 PM

## 2023-04-11 DIAGNOSIS — I27.20 PULMONARY HYPERTENSION (H): ICD-10-CM

## 2023-04-14 RX ORDER — FUROSEMIDE 40 MG
40 TABLET ORAL 2 TIMES DAILY
Qty: 180 TABLET | Refills: 3 | Status: SHIPPED | OUTPATIENT
Start: 2023-04-14 | End: 2023-10-04

## 2023-04-14 NOTE — TELEPHONE ENCOUNTER
FUROSEMIDE 40MG TABLET  Last Written Prescription Date:  ?  Last Fill Quantity: ?,   # refills: ?  Last Office Visit :  8/29/2022  Future Office visit:  None  Routing refill request to provider for review/approval because:  Drug not active on patient's medication list      Bertha García RN  Central Triage Red Flags/Med Refills

## 2023-04-14 NOTE — TELEPHONE ENCOUNTER
Medication Refill double check:    Last office visit was on 4/4/23  with .    Follow up was recommended for 6 months.    Any additional encounters with changes to requested med? no    Authorizing provider is: Dr. Das    Refill was approved.     Additional orders/notes:  LM for patient asking him to clarify which water pill he's taking; Furosemide or Torsemide.  Left my direct dial number for call back.    Hannah Thompson RN on 4/14/2023 at 11:53 AM

## 2023-06-01 ENCOUNTER — HEALTH MAINTENANCE LETTER (OUTPATIENT)
Age: 58
End: 2023-06-01

## 2023-07-06 DIAGNOSIS — I50.31 ACUTE DIASTOLIC HEART FAILURE (H): ICD-10-CM

## 2023-07-12 NOTE — TELEPHONE ENCOUNTER
JARDIANCE 10MG TABLET    Last Written Prescription Date:  8/29/22  Last Fill Quantity: 90,   # refills: 3  Last Office Visit : 4/4/23 Pipo Das  Future Office visit:  none    Routing refill request to provider for review/approval because:  Pipo/ Thiago

## 2023-07-13 RX ORDER — EMPAGLIFLOZIN 10 MG/1
TABLET, FILM COATED ORAL
Qty: 90 TABLET | Refills: 3 | Status: SHIPPED | OUTPATIENT
Start: 2023-07-13

## 2023-10-04 ENCOUNTER — TELEPHONE (OUTPATIENT)
Dept: CARDIOLOGY | Facility: CLINIC | Age: 58
End: 2023-10-04
Payer: COMMERCIAL

## 2023-10-04 NOTE — TELEPHONE ENCOUNTER
Patient was seen by Dr. Das at [facility] on 10/4/2023.    Plan:  Echo soon near [place] - Scheduled for 11/8/23  Follow up in [place] in April '24 w/labs  Med Rec completed    Additional Action needed:  N/A    [place] scheduling needs:   None      Plan was reviewed with the patient at time of the visit.  Patient verbalized understanding, agreed with plan and denied any further questions. Hannah Thompson RN on 10/4/2023 at 11:21 AM

## 2023-11-15 ENCOUNTER — TELEPHONE (OUTPATIENT)
Dept: CARDIOLOGY | Facility: CLINIC | Age: 58
End: 2023-11-15

## 2023-11-15 NOTE — TELEPHONE ENCOUNTER
Per [facility] chart, patient no-showed his scheduled Echo on 11/8/23.  I sent a staff msg to Main [facility] schedulers to call patient & re-schedule testing. Hannah Thompson RN on 11/15/2023 at 3:45 PM      ----- Message from Hannah Thompson RN sent at 10/26/2023 11:30 AM CDT -----  Regarding: Echo results  Patient had Echo scheduled @ [facility] on 11/8/23.  Verify Dr. Das reviews them.

## 2024-06-16 ENCOUNTER — HEALTH MAINTENANCE LETTER (OUTPATIENT)
Age: 59
End: 2024-06-16

## 2024-07-02 ENCOUNTER — TELEPHONE (OUTPATIENT)
Dept: CARDIOLOGY | Facility: CLINIC | Age: 59
End: 2024-07-02
Payer: COMMERCIAL

## 2024-07-02 DIAGNOSIS — R06.02 SOB (SHORTNESS OF BREATH): Primary | ICD-10-CM

## 2024-07-02 NOTE — TELEPHONE ENCOUNTER
Labs resulted after patient left clinic which showed increased Cr, so VORB received from Dr. Das to reduce Torsemide to 80mg once per day.  Hannah Thompson RN on 7/2/2024 at 5:42 PM

## 2024-07-02 NOTE — TELEPHONE ENCOUNTER
Patient was seen by Dr. Das at [facility] on 7/2/2024.    Plan:  Dr. Das spoke with MANN Benitez about possibly qualifying for research study  Follow up in [place] in 6 months with Labs & Echo    Additional Action needed:  None    [employer] scheduling needs:   N/A      Plan was reviewed with the patient at time of the visit.  Patient verbalized understanding, agreed with plan and denied any further questions. Hannah Thompson RN on 7/2/2024 at 2:50 PM

## 2024-07-03 RX ORDER — TORSEMIDE 20 MG/1
80 TABLET ORAL DAILY
Qty: 360 TABLET | Refills: 1 | Status: SHIPPED | OUTPATIENT
Start: 2024-07-03

## 2024-10-09 DIAGNOSIS — R06.02 SOB (SHORTNESS OF BREATH): ICD-10-CM

## 2024-10-15 RX ORDER — TORSEMIDE 20 MG/1
80 TABLET ORAL DAILY
Qty: 360 TABLET | Refills: 1 | OUTPATIENT
Start: 2024-10-15

## 2025-01-30 NOTE — Clinical Note
Copied from CRM #[id number]. Topic: MW Messaging - MW Patient Request  >> Jan 30, 2025  2:25 PM Melani CARNEY wrote:  Jr Coughlin called requesting to send a general message to clinician.   Verified issue is NOT regarding a symptom(s) requiring routine or emergent triage. Verified another message template type and CRM does not apply.    Selected 'Wrap Up CRM' and created new Telephone Encounter after clicking 'Convert to Clinical Call'. Selected appropriate Reason for Call.  Sent Pt message template and routed as routine priority per Clinician KB page to appropriate clinician pool. Readback full message.    -- DO NOT REPLY / DO NOT REPLY ALL --  -- This inbox is not monitored. If this was sent to the wrong provider or department, reroute message to P ECO Reroute pool. --  -- Message is from Engagement Center Operations (ECO) --    General Patient Message: Patient went to the emergency room on last night swollen parotid gland on the jaw the right one still very swollen and hurts and they did a CT scan. Patient is asking the doctor to please give him a call back to advise him on what to do about the pain the pain level is a 7.  Caller Information       Contact Date/Time Type Contact Phone/Fax    01/30/2025 02:22 PM CST Phone (Incoming) Jr Coughlin [phone]            Alternative phone number: no    Can a detailed message be left? Yes - Voicemail   Patient has been advised the message will be addressed within 2-3 business days.                 Removed intact

## 2025-06-21 ENCOUNTER — HEALTH MAINTENANCE LETTER (OUTPATIENT)
Age: 60
End: 2025-06-21

## (undated) DEVICE — Device

## (undated) DEVICE — CATH ANGIO INFINITI PIGTAIL 145 6 SH 6FRX110CM  534-652S

## (undated) DEVICE — INTRO SHEATH MICRO PLATINUM TIP 4FRX40CM 7274

## (undated) DEVICE — KIT HAND CONTROL ACIST 014644 AR-P54

## (undated) DEVICE — INTRO SHEATH 7FRX10CM PINNACLE RSS702

## (undated) DEVICE — PACK HEART RIGHT CUSTOM SAN32RHF18

## (undated) DEVICE — MANIFOLD KIT ANGIO AUTOMATED 014613

## (undated) DEVICE — WIRE GUIDE 0.035"X150CM EMERALD J TIP 502521

## (undated) DEVICE — UMMC CONVENIENCE KIT FORMALLY H9656021017160 NEW# 602101716

## (undated) RX ORDER — LIDOCAINE 40 MG/G
CREAM TOPICAL
Status: DISPENSED
Start: 2020-12-30

## (undated) RX ORDER — LIDOCAINE 40 MG/G
CREAM TOPICAL
Status: DISPENSED
Start: 2022-09-21

## (undated) RX ORDER — LIDOCAINE 40 MG/G
CREAM TOPICAL
Status: DISPENSED
Start: 2021-09-10